# Patient Record
Sex: MALE | Race: WHITE | HISPANIC OR LATINO | Employment: OTHER | URBAN - METROPOLITAN AREA
[De-identification: names, ages, dates, MRNs, and addresses within clinical notes are randomized per-mention and may not be internally consistent; named-entity substitution may affect disease eponyms.]

---

## 2017-02-09 NOTE — PROGRESS NOTES
"  SUBJECTIVE:                                                    Latrell Knowles is a 51 year old male who presents to clinic today for the following health issues:      Diabetes Follow-up      Patient is checking blood sugars: every other day 120-130 in am    Diabetic concerns: None     Symptoms of hypoglycemia (low blood sugar): none     Paresthesias (numbness or burning in feet) or sores: Yes tingling in feet     Date of last diabetic eye exam: 7/2015     Hyperlipidemia Follow-Up      Rate your low fat/cholesterol diet?: good    Taking statin?  Yes, no muscle aches from statin    Other lipid medications/supplements?:  none     Hypertension Follow-up      Outpatient blood pressures are not being checked.    Low Salt Diet: no added salt         Amount of exercise or physical activity: 4 days/week for an average of 2 hours    Problems taking medications regularly: No    Medication side effects: none    Diet: portion control    Ongoing low back and leg pain even after low back surgery. Is very frustrated as his symptoms aren't improving and he is unable to work.     Problem list and histories reviewed & adjusted, as indicated.  Additional history: as documented    Problem list, Medication list, Allergies, and Medical/Social/Surgical histories reviewed in EPIC and updated as appropriate.    ROS:  Constitutional, HEENT, cardiovascular, pulmonary, GI, , musculoskeletal, neuro, skin, endocrine and psych systems are negative, except as otherwise noted.    OBJECTIVE:                                                    /66  Pulse 72  Temp 98  F (36.7  C) (Oral)  Ht 5' 8.5\" (1.74 m)  Wt 175 lb 6.4 oz (79.6 kg)  BMI 26.28 kg/m2  Body mass index is 26.28 kg/(m^2).  GENERAL: healthy, alert and no distress  NECK: no adenopathy, no asymmetry, masses, or scars and thyroid normal to palpation  RESP: lungs clear to auscultation - no rales, rhonchi or wheezes  CV: regular rate and rhythm, normal S1 S2, no S3 or S4, no " murmur, click or rub, no peripheral edema and peripheral pulses strong  ABDOMEN: soft, nontender, no hepatosplenomegaly, no masses and bowel sounds normal  MS: no gross musculoskeletal defects noted, no edema  NEURO: Normal strength and tone, mentation intact and speech normal  BACK: no CVA tenderness, no paralumbar tenderness  PSYCH: mentation appears normal, affect normal/bright    Diagnostic Test Results:  Results for orders placed or performed in visit on 02/13/17   Hepatitis C Screen Reflex to HCV RNA Quant and Genotype   Result Value Ref Range    Hepatitis C Antibody  NR     Nonreactive   Assay performance characteristics have not been established for newborns,   infants, and children     HEMOGLOBIN A1C   Result Value Ref Range    Hemoglobin A1C 9.1 (H) 4.3 - 6.0 %   Comprehensive metabolic panel   Result Value Ref Range    Sodium 138 133 - 144 mmol/L    Potassium 5.3 3.4 - 5.3 mmol/L    Chloride 104 94 - 109 mmol/L    Carbon Dioxide 30 20 - 32 mmol/L    Anion Gap 4 3 - 14 mmol/L    Glucose 200 (H) 70 - 99 mg/dL    Urea Nitrogen 13 7 - 30 mg/dL    Creatinine 0.72 0.66 - 1.25 mg/dL    GFR Estimate >90  Non  GFR Calc   >60 mL/min/1.7m2    GFR Estimate If Black >90   GFR Calc   >60 mL/min/1.7m2    Calcium 9.4 8.5 - 10.1 mg/dL    Bilirubin Total 0.5 0.2 - 1.3 mg/dL    Albumin 4.2 3.4 - 5.0 g/dL    Protein Total 7.7 6.8 - 8.8 g/dL    Alkaline Phosphatase 106 40 - 150 U/L    ALT 28 0 - 70 U/L    AST 20 0 - 45 U/L   Albumin Random Urine Quantitative   Result Value Ref Range    Creatinine Urine 137 mg/dL    Albumin Urine mg/L 38 mg/L    Albumin Urine mg/g Cr 27.96 (H) 0 - 17 mg/g Cr        ASSESSMENT/PLAN:                                                      1. Type 2 diabetes mellitus with diabetic nephropathy, without long-term current use of insulin (H)  Has not been watching diet, but has been walking. Labs today.   - HEMOGLOBIN A1C  - atorvastatin (LIPITOR) 10 MG tablet; Take 1  tablet (10 mg) by mouth daily  Dispense: 90 tablet; Refill: 3  - metFORMIN (GLUCOPHAGE-XR) 500 MG 24 hr tablet; Take 4 tablets (2,000 mg) by mouth daily (with dinner)  Dispense: 360 tablet; Refill: 3  - Comprehensive metabolic panel  - Albumin Random Urine Quantitative  - Hemoglobin A1c; Future  - Lipid panel reflex to direct LDL; Future  - Comprehensive metabolic panel; Future    2. Hyperlipidemia LDL goal <100  On statin  - Comprehensive metabolic panel    3. CKD (chronic kidney disease) stage 1, GFR 90 ml/min or greater  Excellent GFR.     4. Microalbuminuria due to type 2 diabetes mellitus (H)  Doing well.  - lisinopril (PRINIVIL/ZESTRIL) 10 MG tablet; Take 1 tablet (10 mg) by mouth daily  Dispense: 90 tablet; Refill: 3    5. Need for hepatitis C screening test    - Hepatitis C Screen Reflex to HCV RNA Quant and Genotype    6. Screen for colon cancer      7. Need for pneumococcal vaccine    - PNEUMOCOCCAL CONJ VACCINE 13 VALENT IM (PCV13)    8. Chronic left-sided low back pain with left-sided sciatica  He is agreeable to consult with pain management team and see if they have other recommendations for him.  - PAIN MANAGEMENT CENTER ([place]) REFERRAL    Patient Instructions   Let's do fasting labs in May. Let's meet after that in clinic.    I've done a referral to the pain management center in [place]. I'm hoping they can make recommendations to help with your pain.    Let's keep your medications the same for now.      Jo Recinos MD  [facility]

## 2017-02-13 ENCOUNTER — OFFICE VISIT (OUTPATIENT)
Dept: PEDIATRICS | Facility: CLINIC | Age: 52
End: 2017-02-13
Payer: COMMERCIAL

## 2017-02-13 VITALS
HEIGHT: 69 IN | WEIGHT: 175.4 LBS | TEMPERATURE: 98 F | SYSTOLIC BLOOD PRESSURE: 108 MMHG | BODY MASS INDEX: 25.98 KG/M2 | DIASTOLIC BLOOD PRESSURE: 66 MMHG | HEART RATE: 72 BPM

## 2017-02-13 DIAGNOSIS — G89.29 CHRONIC LEFT-SIDED LOW BACK PAIN WITH LEFT-SIDED SCIATICA: ICD-10-CM

## 2017-02-13 DIAGNOSIS — E11.29 MICROALBUMINURIA DUE TO TYPE 2 DIABETES MELLITUS (H): ICD-10-CM

## 2017-02-13 DIAGNOSIS — R80.9 MICROALBUMINURIA DUE TO TYPE 2 DIABETES MELLITUS (H): ICD-10-CM

## 2017-02-13 DIAGNOSIS — E78.5 HYPERLIPIDEMIA LDL GOAL <100: ICD-10-CM

## 2017-02-13 DIAGNOSIS — N18.1 CKD (CHRONIC KIDNEY DISEASE) STAGE 1, GFR 90 ML/MIN OR GREATER: ICD-10-CM

## 2017-02-13 DIAGNOSIS — M54.42 CHRONIC LEFT-SIDED LOW BACK PAIN WITH LEFT-SIDED SCIATICA: ICD-10-CM

## 2017-02-13 DIAGNOSIS — Z11.59 NEED FOR HEPATITIS C SCREENING TEST: ICD-10-CM

## 2017-02-13 DIAGNOSIS — E11.21 TYPE 2 DIABETES MELLITUS WITH DIABETIC NEPHROPATHY, WITHOUT LONG-TERM CURRENT USE OF INSULIN (H): Primary | ICD-10-CM

## 2017-02-13 DIAGNOSIS — Z12.11 SCREEN FOR COLON CANCER: ICD-10-CM

## 2017-02-13 DIAGNOSIS — Z23 NEED FOR PNEUMOCOCCAL VACCINE: ICD-10-CM

## 2017-02-13 LAB — HBA1C MFR BLD: 9.1 % (ref 4.3–6)

## 2017-02-13 PROCEDURE — 90471 IMMUNIZATION ADMIN: CPT | Performed by: INTERNAL MEDICINE

## 2017-02-13 PROCEDURE — 90670 PCV13 VACCINE IM: CPT | Performed by: INTERNAL MEDICINE

## 2017-02-13 PROCEDURE — 80053 COMPREHEN METABOLIC PANEL: CPT | Performed by: INTERNAL MEDICINE

## 2017-02-13 PROCEDURE — 86803 HEPATITIS C AB TEST: CPT | Performed by: INTERNAL MEDICINE

## 2017-02-13 PROCEDURE — 99214 OFFICE O/P EST MOD 30 MIN: CPT | Mod: 25 | Performed by: INTERNAL MEDICINE

## 2017-02-13 PROCEDURE — 82043 UR ALBUMIN QUANTITATIVE: CPT | Performed by: INTERNAL MEDICINE

## 2017-02-13 PROCEDURE — 36415 COLL VENOUS BLD VENIPUNCTURE: CPT | Performed by: INTERNAL MEDICINE

## 2017-02-13 PROCEDURE — 83036 HEMOGLOBIN GLYCOSYLATED A1C: CPT | Performed by: INTERNAL MEDICINE

## 2017-02-13 RX ORDER — METFORMIN HCL 500 MG
2000 TABLET, EXTENDED RELEASE 24 HR ORAL
Qty: 360 TABLET | Refills: 3 | Status: SHIPPED | OUTPATIENT
Start: 2017-02-13 | End: 2018-06-27

## 2017-02-13 RX ORDER — ATORVASTATIN CALCIUM 10 MG/1
10 TABLET, FILM COATED ORAL DAILY
Qty: 90 TABLET | Refills: 3 | Status: SHIPPED | OUTPATIENT
Start: 2017-02-13 | End: 2018-06-27

## 2017-02-13 RX ORDER — LISINOPRIL 10 MG/1
10 TABLET ORAL DAILY
Qty: 90 TABLET | Refills: 3 | Status: SHIPPED | OUTPATIENT
Start: 2017-02-13 | End: 2018-05-03

## 2017-02-13 NOTE — MR AVS SNAPSHOT
After Visit Summary   2/13/2017    Latrell Knowles    MRN: [id number]           Patient Information     Date Of Birth          1965        Visit Information        Provider Department      2/13/2017 1:30 PM Jo Rosado MD [facility] [place]        Today's Diagnoses     Type 2 diabetes mellitus with diabetic nephropathy, without long-term current use of insulin (H)    -  1    Hyperlipidemia LDL goal <100        CKD (chronic kidney disease) stage 1, GFR 90 ml/min or greater        Microalbuminuria due to type 2 diabetes mellitus (H)        Need for hepatitis C screening test        Screen for colon cancer        Need for pneumococcal vaccine        Chronic left-sided low back pain with left-sided sciatica          Care Instructions    Let's do fasting labs in May. Let's meet after that in clinic.    I've done a referral to the pain management center in [place]. I'm hoping they can make recommendations to help with your pain.    Let's keep your medications the same for now.        Follow-ups after your visit        Additional Services     PAIN MANAGEMENT CENTER ([place]) REFERRAL       Your provider has referred you to the [facility].    Reason for Referral: Comprehensive Evaluation and Management    Please complete the following questions:    What is your diagnosis for the patient's pain? Lumbar disk disease/persistent pain after back surgery    Do you have any specific questions for the pain specialist? No    Are there any red flags that may impact the assessment or management of the patient? None    **ANY DIAGNOSTIC TESTS THAT ARE NOT IN [facility] SHOULD BE SENT TO THE PAIN CENTER**    Please note the Pre-Op Pain Consult must be scheduled 2-3 weeks prior to the patient's surgery.  Patient's trying to schedule within 2 weeks of surgery may not be accommodated.     Pre-Op Pain Consults are only good for 30 days.    REGARDING OPIOID MEDICATIONS:  We will always  address appropriateness of opioid pain medications, but we generally will not automatically take on a prescribing role. When we do take on prescribing of opioids for chronic pain, it is in collaboration with the referring physician for an intermediate period of time (months), with an expectation that the primary physician or provider will assume the prescribing role if medications are effective at stable doses with demonstrated compliance.  Therefore, please do not assume that your prescribing responsibilities end on the day of pain clinic consultation.  Is this agreeable to you? YES    For any questions, contact the [facility] at [phone].    Please be aware that coverage of these services is subject to the terms and limitations of your health insurance plan.  Call member services at your health plan with any benefit or coverage questions.      Please bring the following with you to your appointment:    (1) Any X-Rays, CTs or MRIs which have been performed.  Contact the facility where they were done to arrange for  prior to your scheduled appointment.    (2) List of current medications   (3) This referral request   (4) Any documents/labs given to you for this referral                  Future tests that were ordered for you today     Open Future Orders        Priority Expected Expires Ordered    Hemoglobin A1c Routine  6/13/2017 2/13/2017    Lipid panel reflex to direct LDL Routine  8/16/2017 2/13/2017    Comprehensive metabolic panel Routine  8/15/2017 2/13/2017            Who to contact     If you have questions or need follow up information about today's clinic visit or your schedule please contact [facility] directly at [phone].  Normal or non-critical lab and imaging results will be communicated to you by MyChart, letter or phone within 4 business days after the clinic has received the results. If you do not hear from us within 7 days, please contact the clinic  "through [employer] or phone. If you have a critical or abnormal lab result, we will notify you by phone as soon as possible.  Submit refill requests through [employer] or call your pharmacy and they will forward the refill request to us. Please allow 3 business days for your refill to be completed.          Additional Information About Your Visit        [employer] Information     [employer] gives you secure access to your electronic health record. If you see a primary care provider, you can also send messages to your care team and make appointments. If you have questions, please call your primary care clinic.  If you do not have a primary care provider, please call [phone] and they will assist you.        Care EveryWhere ID     This is your Care EveryWhere ID. This could be used by other organizations to access your [facility] medical records  [id number]        Your Vitals Were     Pulse Temperature Height BMI (Body Mass Index)          72 98  F (36.7  C) (Oral) 5' 8.5\" (1.74 m) 26.28 kg/m2         Blood Pressure from Last 3 Encounters:   02/13/17 108/66   05/09/16 110/72   08/17/15 126/78    Weight from Last 3 Encounters:   02/13/17 175 lb 6.4 oz (79.6 kg)   05/09/16 180 lb 4.8 oz (81.8 kg)   08/17/15 173 lb 9.6 oz (78.7 kg)              We Performed the Following     Albumin Random Urine Quantitative     Comprehensive metabolic panel     HEMOGLOBIN A1C     Hepatitis C Screen Reflex to HCV RNA Quant and Genotype     PAIN MANAGEMENT CENTER ([place]) REFERRAL     PNEUMOCOCCAL CONJ VACCINE 13 VALENT IM (PCV13)          Today's Medication Changes          These changes are accurate as of: 2/13/17  2:23 PM.  If you have any questions, ask your nurse or doctor.               These medicines have changed or have updated prescriptions.        Dose/Directions    gabapentin 300 MG capsule   Commonly known as:  NEURONTIN   This may have changed:    - when to take this  - additional instructions   Used for:  Chronic low back pain    "     Dose:  300 mg   Take 1 capsule (300 mg) by mouth 3 times daily Take 1 tablet (300 mg) every night for 1-3 days, then 1 tablet twice daily for 1-3 days, then 1 tablet three times daily   Quantity:  90 capsule   Refills:  0       lisinopril 10 MG tablet   Commonly known as:  PRINIVIL/ZESTRIL   This may have changed:  Another medication with the same name was removed. Continue taking this medication, and follow the directions you see here.   Used for:  Microalbuminuria due to type 2 diabetes mellitus (H)   Changed by:  Jo Rosado MD        Dose:  10 mg   Take 1 tablet (10 mg) by mouth daily   Quantity:  90 tablet   Refills:  3            Where to get your medicines      These medications were sent to [id number] - [place], [place] - [address]  [address] [place] [address]     Phone:  [phone]     atorvastatin 10 MG tablet    lisinopril 10 MG tablet    metFORMIN 500 MG 24 hr tablet                Primary Care Provider Office Phone # Fax #    Jo Rosado [phone]       [address] [place] [address]        Thank you!     Thank you for choosing [facility]  for your care. Our goal is always to provide you with excellent care. Hearing back from our patients is one way we can continue to improve our services. Please take a few minutes to complete the written survey that you may receive in the mail after your visit with us. Thank you!             Your Updated Medication List - Protect others around you: Learn how to safely use, store and throw away your medicines at www.disposemymeds.org.          This list is accurate as of: 2/13/17  2:23 PM.  Always use your most recent med list.                   Brand Name Dispense Instructions for use    atorvastatin 10 MG tablet    LIPITOR    90 tablet    Take 1 tablet (10 mg) by mouth daily       blood glucose monitoring lancets     3 Box    1 each 4 times daily Use to test blood sugar 4  times daily or as directed.       blood glucose monitoring test strip    ACCU-CHEK SMARTVIEW    3 Box    1 strip by In Vitro route 4 times daily (before meals and nightly) Use to test blood sugar 4 times daily or as directed.       cyclobenzaprine 10 MG tablet    FLEXERIL     Take 10 mg by mouth 3 times daily as needed 1 tab bid       gabapentin 300 MG capsule    NEURONTIN    90 capsule    Take 1 capsule (300 mg) by mouth 3 times daily Take 1 tablet (300 mg) every night for 1-3 days, then 1 tablet twice daily for 1-3 days, then 1 tablet three times daily       ibuprofen 800 MG tablet    ADVIL/MOTRIN     Take 800 mg by mouth every 8 hours as needed for moderate pain       lisinopril 10 MG tablet    PRINIVIL/ZESTRIL    90 tablet    Take 1 tablet (10 mg) by mouth daily       metFORMIN 500 MG 24 hr tablet    GLUCOPHAGE-XR    360 tablet    Take 4 tablets (2,000 mg) by mouth daily (with dinner)       tadalafil 20 MG tablet    CIALIS    10 tablet    Take 1 tablet (20 mg) by mouth daily as needed for erectile dysfunction       terbinafine 1 % cream    lamISIL AT    12 g    Apply topically 2 times daily For fungal infection not resolved with other antifungals (e.g. Clotrimazole)       traMADol 50 MG tablet    ULTRAM     Take by mouth every 6 hours as needed Every 4-6 hours as needed

## 2017-02-13 NOTE — PATIENT INSTRUCTIONS
Let's do fasting labs in May. Let's meet after that in clinic.    I've done a referral to the pain management center in [place]. I'm hoping they can make recommendations to help with your pain.    Let's keep your medications the same for now.

## 2017-02-13 NOTE — NURSING NOTE
"Chief Complaint   Patient presents with     Diabetes       Initial /66  Pulse 72  Temp 98  F (36.7  C) (Oral)  Ht 5' 8.5\" (1.74 m)  Wt 175 lb 6.4 oz (79.6 kg)  BMI 26.28 kg/m2 Estimated body mass index is 26.28 kg/(m^2) as calculated from the following:    Height as of this encounter: 5' 8.5\" (1.74 m).    Weight as of this encounter: 175 lb 6.4 oz (79.6 kg).  Medication Reconciliation: complete   Krystina Severino LPN    "

## 2017-02-14 ENCOUNTER — TELEPHONE (OUTPATIENT)
Dept: PALLIATIVE MEDICINE | Facility: CLINIC | Age: 52
End: 2017-02-14

## 2017-02-14 LAB
ALBUMIN SERPL-MCNC: 4.2 G/DL (ref 3.4–5)
ALP SERPL-CCNC: 106 U/L (ref 40–150)
ALT SERPL W P-5'-P-CCNC: 28 U/L (ref 0–70)
ANION GAP SERPL CALCULATED.3IONS-SCNC: 4 MMOL/L (ref 3–14)
AST SERPL W P-5'-P-CCNC: 20 U/L (ref 0–45)
BILIRUB SERPL-MCNC: 0.5 MG/DL (ref 0.2–1.3)
BUN SERPL-MCNC: 13 MG/DL (ref 7–30)
CALCIUM SERPL-MCNC: 9.4 MG/DL (ref 8.5–10.1)
CHLORIDE SERPL-SCNC: 104 MMOL/L (ref 94–109)
CO2 SERPL-SCNC: 30 MMOL/L (ref 20–32)
CREAT SERPL-MCNC: 0.72 MG/DL (ref 0.66–1.25)
CREAT UR-MCNC: 137 MG/DL
GFR SERPL CREATININE-BSD FRML MDRD: ABNORMAL ML/MIN/1.7M2
GLUCOSE SERPL-MCNC: 200 MG/DL (ref 70–99)
HCV AB SERPL QL IA: NORMAL
MICROALBUMIN UR-MCNC: 38 MG/L
MICROALBUMIN/CREAT UR: 27.96 MG/G CR (ref 0–17)
POTASSIUM SERPL-SCNC: 5.3 MMOL/L (ref 3.4–5.3)
PROT SERPL-MCNC: 7.7 G/DL (ref 6.8–8.8)
SODIUM SERPL-SCNC: 138 MMOL/L (ref 133–144)

## 2017-03-24 ENCOUNTER — TRANSFERRED RECORDS (OUTPATIENT)
Dept: HEALTH INFORMATION MANAGEMENT | Facility: CLINIC | Age: 52
End: 2017-03-24

## 2017-05-10 ENCOUNTER — TELEPHONE (OUTPATIENT)
Dept: PHARMACY | Facility: CLINIC | Age: 52
End: 2017-05-10

## 2017-05-10 NOTE — LETTER
[facility]  [address]  [address]  [place] [address] [phone]      June 6, 2017      Latrell Knowles  [address]     [place] [address]        Dear Kyree Sams, your provider has reviewed your chart and it looks like you are due for a diabetic follow up office visit. If you could schedule this to your earliest convenience. If you have any questions feel free to call the clinic at [phone] . Thank you.                Sincerely, Grisel ROSE

## 2017-05-10 NOTE — TELEPHONE ENCOUNTER
Please have patient make a visit with Dr. Rosado for follow-up diabetes.    May Mcgowan, PharmD [facility]  Medication Therapy Management Practitioner   #[phone]

## 2017-05-11 NOTE — TELEPHONE ENCOUNTER
Type of outreach:  Sent Re[employer] message.  Health Maintenance Due   Topic Date Due     COLON CANCER SCREEN (SYSTEM ASSIGNED)  04/02/2014     EYE EXAM Q1 YEAR( NO INBASKET)  07/21/2016     TSH W/ FREE T4 REFLEX Q2 YEAR (NO INBASKET)  05/06/2017     FOOT EXAM Q1 YEAR( NO INBASKET)  05/09/2017     LIPID MONITORING Q1 YEAR( NO INBASKET)  05/09/2017     Genesis Lloyd MA

## 2017-06-06 NOTE — TELEPHONE ENCOUNTER
Type of outreach:  Phone, left message for patient to call back.  and Sent letter.  Health Maintenance Due   Topic Date Due     COLON CANCER SCREEN (SYSTEM ASSIGNED)  04/02/2014     EYE EXAM Q1 YEAR  07/21/2016     TSH W/ FREE T4 REFLEX Q2 YEAR  05/06/2017     FOOT EXAM Q1 YEAR  05/09/2017     LIPID MONITORING Q1 YEAR  05/09/2017     Genesis Lloyd MA

## 2017-09-14 ENCOUNTER — TELEPHONE (OUTPATIENT)
Dept: PEDIATRICS | Facility: CLINIC | Age: 52
End: 2017-09-14

## 2017-09-14 NOTE — TELEPHONE ENCOUNTER
Type of outreach:  Phone, left message for patient to call back.   Health Maintenance Due   Topic Date Due     COLON CANCER SCREEN (SYSTEM ASSIGNED)  04/02/2014     EYE EXAM Q1 YEAR  07/21/2016     TSH W/ FREE T4 REFLEX Q2 YEAR  05/06/2017     FOOT EXAM Q1 YEAR  05/09/2017     LIPID MONITORING Q1 YEAR  05/09/2017     A1C Q6 MO  08/13/2017     INFLUENZA VACCINE (SYSTEM ASSIGNED)  09/01/2017     Krystina Severino LPN

## 2018-01-03 ENCOUNTER — OFFICE VISIT (OUTPATIENT)
Dept: PEDIATRICS | Facility: CLINIC | Age: 53
End: 2018-01-03
Payer: MEDICAID

## 2018-01-03 ENCOUNTER — HOSPITAL ENCOUNTER (EMERGENCY)
Facility: CLINIC | Age: 53
Discharge: HOME | End: 2018-01-03
Attending: EMERGENCY MEDICINE | Admitting: EMERGENCY MEDICINE
Payer: MEDICAID

## 2018-01-03 ENCOUNTER — APPOINTMENT (OUTPATIENT)
Dept: GENERAL RADIOLOGY | Facility: CLINIC | Age: 53
End: 2018-01-03
Attending: EMERGENCY MEDICINE
Payer: MEDICAID

## 2018-01-03 VITALS
HEART RATE: 62 BPM | WEIGHT: 174.3 LBS | DIASTOLIC BLOOD PRESSURE: 70 MMHG | TEMPERATURE: 97.7 F | SYSTOLIC BLOOD PRESSURE: 120 MMHG | BODY MASS INDEX: 25.82 KG/M2 | HEIGHT: 69 IN | OXYGEN SATURATION: 98 %

## 2018-01-03 VITALS
DIASTOLIC BLOOD PRESSURE: 96 MMHG | HEART RATE: 53 BPM | BODY MASS INDEX: 24.91 KG/M2 | TEMPERATURE: 97.6 F | RESPIRATION RATE: 16 BRPM | OXYGEN SATURATION: 98 % | WEIGHT: 174 LBS | HEIGHT: 70 IN | SYSTOLIC BLOOD PRESSURE: 145 MMHG

## 2018-01-03 DIAGNOSIS — R07.89 CHEST PRESSURE: Primary | ICD-10-CM

## 2018-01-03 DIAGNOSIS — R07.9 CHEST PAIN, UNSPECIFIED TYPE: ICD-10-CM

## 2018-01-03 LAB
ANION GAP SERPL CALCULATED.3IONS-SCNC: 6 MMOL/L (ref 3–14)
BASOPHILS # BLD AUTO: 0 10E9/L (ref 0–0.2)
BASOPHILS NFR BLD AUTO: 0.4 %
BUN SERPL-MCNC: 12 MG/DL (ref 7–30)
CALCIUM SERPL-MCNC: 8.9 MG/DL (ref 8.5–10.1)
CHLORIDE SERPL-SCNC: 101 MMOL/L (ref 94–109)
CO2 SERPL-SCNC: 29 MMOL/L (ref 20–32)
CREAT SERPL-MCNC: 0.62 MG/DL (ref 0.66–1.25)
D DIMER PPP FEU-MCNC: <0.3 UG/ML FEU (ref 0–0.5)
DIFFERENTIAL METHOD BLD: NORMAL
EOSINOPHIL # BLD AUTO: 0.2 10E9/L (ref 0–0.7)
EOSINOPHIL NFR BLD AUTO: 3.2 %
ERYTHROCYTE [DISTWIDTH] IN BLOOD BY AUTOMATED COUNT: 12.3 % (ref 10–15)
GFR SERPL CREATININE-BSD FRML MDRD: >90 ML/MIN/1.7M2
GLUCOSE SERPL-MCNC: 181 MG/DL (ref 70–99)
HBA1C MFR BLD: 8.6 % (ref 4.3–6)
HCT VFR BLD AUTO: 46.5 % (ref 40–53)
HGB BLD-MCNC: 15.8 G/DL (ref 13.3–17.7)
IMM GRANULOCYTES # BLD: 0 10E9/L (ref 0–0.4)
IMM GRANULOCYTES NFR BLD: 0.4 %
INTERPRETATION ECG - MUSE: NORMAL
LYMPHOCYTES # BLD AUTO: 1.3 10E9/L (ref 0.8–5.3)
LYMPHOCYTES NFR BLD AUTO: 26.5 %
MCH RBC QN AUTO: 28.3 PG (ref 26.5–33)
MCHC RBC AUTO-ENTMCNC: 34 G/DL (ref 31.5–36.5)
MCV RBC AUTO: 83 FL (ref 78–100)
MONOCYTES # BLD AUTO: 0.4 10E9/L (ref 0–1.3)
MONOCYTES NFR BLD AUTO: 7.9 %
NEUTROPHILS # BLD AUTO: 3.1 10E9/L (ref 1.6–8.3)
NEUTROPHILS NFR BLD AUTO: 61.6 %
NRBC # BLD AUTO: 0 10*3/UL
NRBC BLD AUTO-RTO: 0 /100
PLATELET # BLD AUTO: 180 10E9/L (ref 150–450)
POTASSIUM SERPL-SCNC: 4 MMOL/L (ref 3.4–5.3)
RBC # BLD AUTO: 5.59 10E12/L (ref 4.4–5.9)
SODIUM SERPL-SCNC: 136 MMOL/L (ref 133–144)
TROPONIN I BLD-MCNC: 0 UG/L (ref 0–0.1)
TROPONIN I SERPL-MCNC: <0.015 UG/L (ref 0–0.04)
WBC # BLD AUTO: 5 10E9/L (ref 4–11)

## 2018-01-03 PROCEDURE — 96361 HYDRATE IV INFUSION ADD-ON: CPT

## 2018-01-03 PROCEDURE — 80061 LIPID PANEL: CPT | Performed by: INTERNAL MEDICINE

## 2018-01-03 PROCEDURE — 80048 BASIC METABOLIC PNL TOTAL CA: CPT | Performed by: EMERGENCY MEDICINE

## 2018-01-03 PROCEDURE — 84484 ASSAY OF TROPONIN QUANT: CPT | Performed by: EMERGENCY MEDICINE

## 2018-01-03 PROCEDURE — 85379 FIBRIN DEGRADATION QUANT: CPT | Performed by: EMERGENCY MEDICINE

## 2018-01-03 PROCEDURE — 96374 THER/PROPH/DIAG INJ IV PUSH: CPT

## 2018-01-03 PROCEDURE — 83036 HEMOGLOBIN GLYCOSYLATED A1C: CPT | Performed by: INTERNAL MEDICINE

## 2018-01-03 PROCEDURE — 99285 EMERGENCY DEPT VISIT HI MDM: CPT | Mod: 25

## 2018-01-03 PROCEDURE — 85025 COMPLETE CBC W/AUTO DIFF WBC: CPT | Performed by: EMERGENCY MEDICINE

## 2018-01-03 PROCEDURE — 71046 X-RAY EXAM CHEST 2 VIEWS: CPT

## 2018-01-03 PROCEDURE — 99215 OFFICE O/P EST HI 40 MIN: CPT | Performed by: INTERNAL MEDICINE

## 2018-01-03 PROCEDURE — 80050 GENERAL HEALTH PANEL: CPT | Performed by: INTERNAL MEDICINE

## 2018-01-03 PROCEDURE — 36415 COLL VENOUS BLD VENIPUNCTURE: CPT | Performed by: INTERNAL MEDICINE

## 2018-01-03 PROCEDURE — 93000 ELECTROCARDIOGRAM COMPLETE: CPT | Performed by: INTERNAL MEDICINE

## 2018-01-03 PROCEDURE — 93005 ELECTROCARDIOGRAM TRACING: CPT

## 2018-01-03 PROCEDURE — 25000128 H RX IP 250 OP 636: Performed by: EMERGENCY MEDICINE

## 2018-01-03 PROCEDURE — 84484 ASSAY OF TROPONIN QUANT: CPT

## 2018-01-03 RX ORDER — HYDROMORPHONE HYDROCHLORIDE 1 MG/ML
0.5 INJECTION, SOLUTION INTRAMUSCULAR; INTRAVENOUS; SUBCUTANEOUS ONCE
Status: COMPLETED | OUTPATIENT
Start: 2018-01-03 | End: 2018-01-03

## 2018-01-03 RX ADMIN — SODIUM CHLORIDE 1000 ML: 9 INJECTION, SOLUTION INTRAVENOUS at 14:16

## 2018-01-03 RX ADMIN — Medication 0.5 MG: at 14:16

## 2018-01-03 ASSESSMENT — ENCOUNTER SYMPTOMS: NAUSEA: 0

## 2018-01-03 NOTE — ED AVS SNAPSHOT
[facility]    [address]    [facility] [address]    Phone:  [phone]    Fax:  [phone]                                       Latrell Knowles   MRN: [id number]    Department:  [facility]   Date of Visit:  1/3/2018           After Visit Summary Signature Page     I have received my discharge instructions, and my questions have been answered. I have discussed any challenges I see with this plan with the nurse or doctor.    ..........................................................................................................................................  Patient/Patient Representative Signature      ..........................................................................................................................................  Patient Representative Print Name and Relationship to Patient    ..................................................               ................................................  Date                                            Time    ..........................................................................................................................................  Reviewed by Signature/Title    ...................................................              ..............................................  Date                                                            Time

## 2018-01-03 NOTE — NURSING NOTE
"Chief Complaint   Patient presents with     Diabetes       Initial /70 (Cuff Size: Adult Regular)  Pulse 62  Temp 97.7  F (36.5  C) (Oral)  Ht 5' 8.7\" (1.745 m)  Wt 174 lb 4.8 oz (79.1 kg)  SpO2 98%  BMI 25.96 kg/m2 Estimated body mass index is 25.96 kg/(m^2) as calculated from the following:    Height as of this encounter: 5' 8.7\" (1.745 m).    Weight as of this encounter: 174 lb 4.8 oz (79.1 kg).  Medication Reconciliation: complete   Krystina Severino LPN      At 1254 gave 4 baby aspirin (chewable) per Dr Rosado's order.    Krystina Severino LPN    "

## 2018-01-03 NOTE — ED AVS SNAPSHOT
[facility]    [address] [place] [address]    Phone:  [phone]    Fax:  [phone]                                       Latrell Knowles   MRN: [id number]    Department:  [facility]   Date of Visit:  1/3/2018           Patient Information     Date Of Birth          1965        Your diagnoses for this visit were:     Chest pain, unspecified type        You were seen by Mayela Lee MD.      Follow-up Information     Follow up with Jo Rosado MD In 3 days.    Specialties:  Internal Medicine, Pediatrics    Contact information:    [address] [place] [address]   [place] [id number] [phone]          Discharge Instructions       Please follow up closely with your regular physician and the stress test.       Please return to the ED if your symptoms worsen or if you develop new or concerning symptoms.     Discharge Instructions  Chest Pain    You have been seen today for chest pain or discomfort.  At this time, your doctor has found no signs that your chest pain is due to a serious or life-threatening condition, (or you have declined more testing and/or admission to the hospital). However, sometimes there is a serious problem that does not show up right away. Your evaluation today may not be complete and you may need further testing and evaluation.     You need to follow-up with your regular doctor within 3 days.    Return to the Emergency Department if:    Your chest pain changes, gets worse, starts to happen more often, or comes with less activity.    You are short of breath.    You get very weak or tired.    You pass out or faint.    You have any new symptoms, like fever, cough, numb legs, or you cough up blood.    You have anything else that worries you.    Until you follow-up with your regular doctor please do the following:    Take one aspirin daily unless you have an allergy or are told not to by  your doctor.    If a stress test appointment has been made, go to the appointment.    If you have questions, contact your regular doctor.    If your doctor today has told you to follow-up with your regular doctor, it is very important that you make an appointment with your clinic and go to the appointment.  If you do not follow-up with your primary doctor, it may result in missing an important development which could result in permanent injury or disability and/or lasting pain.  If there is any problem keeping your appointment, call your doctor or return to the Emergency Department.    If you were given a prescription for medicine here today, be sure to read all of the information (including the package insert) that comes with your prescription.  This will include important information about the medicine, its side effects, and any warnings that you need to know about.  The pharmacist who fills the prescription can provide more information and answer questions you may have about the medicine.  If you have questions or concerns that the pharmacist cannot address, please call or return to the Emergency Department.     Opioid Medication Information    Pain medications are among the most commonly prescribed medicines, so we are including this information for all our patients. If you did not receive pain medication or get a prescription for pain medicine, you can ignore it.     You may have been given a prescription for an opioid (narcotic) pain medicine and/or have received a pain medicine while here in the Emergency Department. These medicines can make you drowsy or impaired. You must not drive, operate dangerous equipment, or engage in any other dangerous activities while taking these medications. If you drive while taking these medications, you could be arrested for DUI, or driving under the influence. Do not drink any alcohol while you are taking these medications.     Opioid pain medications can cause addiction. If  you have a history of chemical dependency of any type, you are at a higher risk of becoming addicted to pain medications.  Only take these prescribed medications to treat your pain when all other options have been tried. Take it for as short a time and as few doses as possible. Store your pain pills in a secure place, as they are frequently stolen and provide a dangerous opportunity for children or visitors in your house to start abusing these powerful medications. We will not replace any lost or stolen medicine.  As soon as your pain is better, you should flush all your remaining medication.     Many prescription pain medications contain Tylenol  (acetaminophen), including Vicodin , Tylenol #3 , Norco , Lortab , and Percocet .  You should not take any extra pills of Tylenol  if you are using these prescription medications or you can get very sick.  Do not ever take more than 3000 mg of acetaminophen in any 24 hour period.    All opioids tend to cause constipation. Drink plenty of water and eat foods that have a lot of fiber, such as fruits, vegetables, prune juice, apple juice and high fiber cereal.  Take a laxative if you don t move your bowels at least every other day. Miralax , Milk of Magnesia, Colace , or Senna  can be used to keep you regular.      Remember that you can always come back to the Emergency Department if you are not able to see your regular doctor in the amount of time listed above, if you get any new symptoms, or if there is anything that worries you.          24 Hour Appointment Hotline       To make an appointment at any [facility], call [phone] ([phone]). If you don't have a family doctor or clinic, we will help you find one. [place] clinics are conveniently located to serve the needs of you and your family.          ED Discharge Orders     Exercise Stress Echocardiogram       Administration of IV contrast will be tailored to this examination per the appropriate written  protocol listed in the Echocardiography department Protocol Book, or by the supervising Cardiologist. This may result in an order change.    Use of contrast is at the discretion of the supervising Cardiologist.                     Review of your medicines      Our records show that you are taking the medicines listed below. If these are incorrect, please call your family doctor or clinic.        Dose / Directions Last dose taken    aspirin 81 MG tablet   Dose:  324 mg        Take 324 mg by mouth once   Refills:  0        atorvastatin 10 MG tablet   Commonly known as:  LIPITOR   Dose:  10 mg   Quantity:  90 tablet        Take 1 tablet (10 mg) by mouth daily   Refills:  3        blood glucose monitoring lancets   Dose:  1 each   Quantity:  3 Box        1 each 4 times daily Use to test blood sugar 4 times daily or as directed.   Refills:  3        blood glucose monitoring test strip   Commonly known as:  ACCU-CHEK SMARTVIEW   Dose:  1 strip   Quantity:  3 Box        1 strip by In Vitro route 4 times daily (before meals and nightly) Use to test blood sugar 4 times daily or as directed.   Refills:  prn        cyclobenzaprine 10 MG tablet   Commonly known as:  FLEXERIL   Dose:  10 mg        Take 10 mg by mouth 3 times daily as needed 1 tab bid   Refills:  0        gabapentin 300 MG capsule   Commonly known as:  NEURONTIN   Dose:  300 mg   Quantity:  90 capsule        Take 1 capsule (300 mg) by mouth 3 times daily Take 1 tablet (300 mg) every night for 1-3 days, then 1 tablet twice daily for 1-3 days, then 1 tablet three times daily   Refills:  0        ibuprofen 800 MG tablet   Commonly known as:  ADVIL/MOTRIN   Dose:  800 mg        Take 800 mg by mouth every 8 hours as needed for moderate pain   Refills:  0        lisinopril 10 MG tablet   Commonly known as:  PRINIVIL/ZESTRIL   Dose:  10 mg   Quantity:  90 tablet        Take 1 tablet (10 mg) by mouth daily   Refills:  3        metFORMIN 500 MG 24 hr tablet   Commonly  known as:  GLUCOPHAGE-XR   Dose:  2000 mg   Quantity:  360 tablet        Take 4 tablets (2,000 mg) by mouth daily (with dinner)   Refills:  3        tadalafil 20 MG tablet   Commonly known as:  CIALIS   Dose:  20 mg   Quantity:  10 tablet        Take 1 tablet (20 mg) by mouth daily as needed for erectile dysfunction   Refills:  1        traMADol 50 MG tablet   Commonly known as:  ULTRAM        Take by mouth every 6 hours as needed Every 4-6 hours as needed   Refills:  0                Procedures and tests performed during your visit     Procedure/Test Number of Times Performed    Basic metabolic panel 1    CBC with platelets differential 1    Cardiac Continuous Monitoring 1    Chest XR,  PA & LAT 1    D dimer quantitative 1    EKG 12 lead 2    Peripheral IV: Standard 1    Pulse oximetry nursing 1    Troponin I 1    Troponin POCT 1      Orders Needing Specimen Collection     None      Pending Results     Date and Time Order Name Status Description    1/3/2018 1104 COMPREHENSIVE METABOLIC PANEL In process     1/3/2018 1102 TSH WITH FREE T4 REFLEX In process     1/3/2018 1102 LIPID REFLEX TO DIRECT LDL PANEL In process             Pending Culture Results     No orders found from 1/1/2018 to 1/4/2018.            Pending Results Instructions     If you had any lab results that were not finalized at the time of your Discharge, you can call the ED Lab Result RN at [phone]. You will be contacted by this team for any positive Lab results or changes in treatment. The nurses are available 7 days a week from 10A to 6:30P.  You can leave a message 24 hours per day and they will return your call.        Test Results From Your Hospital Stay        1/3/2018  2:02 PM      Component Results     Component Value Ref Range & Units Status    WBC 5.0 4.0 - 11.0 10e9/L Final    RBC Count 5.59 4.4 - 5.9 10e12/L Final    Hemoglobin 15.8 13.3 - 17.7 g/dL Final    Hematocrit 46.5 40.0 - 53.0 % Final    MCV 83 78 - 100 fl Final    MCH 28.3  26.5 - 33.0 pg Final    MCHC 34.0 31.5 - 36.5 g/dL Final    RDW 12.3 10.0 - 15.0 % Final    Platelet Count 180 150 - 450 10e9/L Final    Diff Method Automated Method  Final    % Neutrophils 61.6 % Final    % Lymphocytes 26.5 % Final    % Monocytes 7.9 % Final    % Eosinophils 3.2 % Final    % Basophils 0.4 % Final    % Immature Granulocytes 0.4 % Final    Nucleated RBCs 0 0 /100 Final    Absolute Neutrophil 3.1 1.6 - 8.3 10e9/L Final    Absolute Lymphocytes 1.3 0.8 - 5.3 10e9/L Final    Absolute Monocytes 0.4 0.0 - 1.3 10e9/L Final    Absolute Eosinophils 0.2 0.0 - 0.7 10e9/L Final    Absolute Basophils 0.0 0.0 - 0.2 10e9/L Final    Abs Immature Granulocytes 0.0 0 - 0.4 10e9/L Final    Absolute Nucleated RBC 0.0  Final         1/3/2018  2:31 PM      Component Results     Component Value Ref Range & Units Status    Sodium 136 133 - 144 mmol/L Final    Potassium 4.0 3.4 - 5.3 mmol/L Final    Chloride 101 94 - 109 mmol/L Final    Carbon Dioxide 29 20 - 32 mmol/L Final    Anion Gap 6 3 - 14 mmol/L Final    Glucose 181 (H) 70 - 99 mg/dL Final    Urea Nitrogen 12 7 - 30 mg/dL Final    Creatinine 0.62 (L) 0.66 - 1.25 mg/dL Final    GFR Estimate >90 >60 mL/min/1.7m2 Final    Non  GFR Calc    GFR Estimate If Black >90 >60 mL/min/1.7m2 Final    African American GFR Calc    Calcium 8.9 8.5 - 10.1 mg/dL Final         1/3/2018  2:31 PM      Component Results     Component Value Ref Range & Units Status    Troponin I ES <0.015 0.000 - 0.045 ug/L Final    The 99th percentile for upper reference range is 0.045 ug/L.  Troponin values   in the range of 0.045 - 0.120 ug/L may be associated with risks of adverse   clinical events.           1/3/2018  2:27 PM      Component Results     Component Value Ref Range & Units Status    D Dimer <0.3 0.0 - 0.50 ug/ml FEU Final    This D-dimer assay is intended for use in conjunction with a clinical pretest   probability assessment model to exclude pulmonary embolism (PE) and deep    venous thrombosis (DVT) in outpatients suspected of PE or DVT. The cut-off   value is 0.5 ug/mL FEU.           1/3/2018  2:11 PM      Component Results     Component Value Ref Range & Units Status    Troponin I 0.00 0.00 - 0.10 ug/L Final         1/3/2018  3:45 PM      Narrative     CHEST TWO VIEWS  1/3/2018 3:17 PM    HISTORY:  Chest pain.    COMPARISON:  2/4/2013        Impression     IMPRESSION:  Negative.     LISANDRA RAHMAN MD                Clinical Quality Measure: Blood Pressure Screening     Your blood pressure was checked while you were in the emergency department today. The last reading we obtained was  BP: (!) 143/93 . Please read the guidelines below about what these numbers mean and what you should do about them.  If your systolic blood pressure (the top number) is less than 120 and your diastolic blood pressure (the bottom number) is less than 80, then your blood pressure is normal. There is nothing more that you need to do about it.  If your systolic blood pressure (the top number) is 120-139 or your diastolic blood pressure (the bottom number) is 80-89, your blood pressure may be higher than it should be. You should have your blood pressure rechecked within a year by a primary care provider.  If your systolic blood pressure (the top number) is 140 or greater or your diastolic blood pressure (the bottom number) is 90 or greater, you may have high blood pressure. High blood pressure is treatable, but if left untreated over time it can put you at risk for heart attack, stroke, or kidney failure. You should have your blood pressure rechecked by a primary care provider within the next 4 weeks.  If your provider in the emergency department today gave you specific instructions to follow-up with your doctor or provider even sooner than that, you should follow that instruction and not wait for up to 4 weeks for your follow-up visit.        Thank you for choosing [facility]       Thank you for choosing  [facility] for your care. Our goal is always to provide you with excellent care. Hearing back from our patients is one way we can continue to improve our services. Please take a few minutes to complete the written survey that you may receive in the mail after you visit with us. Thank you!        [facility] Information     [facility] gives you secure access to your electronic health record. If you see a primary care provider, you can also send messages to your care team and make appointments. If you have questions, please call your primary care clinic.  If you do not have a primary care provider, please call [phone] and they will assist you.        Care EveryWhere ID     This is your Care EveryWhere ID. This could be used by other organizations to access your [facility] medical records  [id number]        Equal Access to Services     SHELTON CARO : Yolanda Garcia, quirino blevins, jose miller, grieslda rogers. So [facility] [phone].    ATENCIÓN: Si habla español, tiene a ramirez disposición servicios gratuitos de asistencia lingüística. Llame al [phone].    We comply with applicable federal civil rights laws and [place] laws. We do not discriminate on the basis of race, color, national origin, age, disability, sex, sexual orientation, or gender identity.            After Visit Summary       This is your record. Keep this with you and show to your community pharmacist(s) and doctor(s) at your next visit.

## 2018-01-03 NOTE — ED NOTES
Addended: Left chest pain radiates to left jaw. Waxes and wanes but never goes away. Some shortness of breath. History of diabetes.

## 2018-01-03 NOTE — PROGRESS NOTES
SUBJECTIVE:   Latrell Knowles is a 52 year old male who presents to clinic today for the following health issues:      Diabetes Follow-up      Patient is checking blood sugars: one time every other day 120-200    Diabetic concerns: None     Symptoms of hypoglycemia (low blood sugar): none     Paresthesias (numbness or burning in feet) or sores: Yes numbness in feet     Date of last diabetic eye exam: due, will do and fax    Hyperlipidemia Follow-Up      Rate your low fat/cholesterol diet?: good    Taking statin?  Yes, no muscle aches from statin    Other lipid medications/supplements?:  none    Hypertension Follow-up      Outpatient blood pressures are not being checked.    Low Salt Diet: low salt    BP Readings from Last 2 Encounters:   02/13/17 108/66   05/09/16 110/72     Hemoglobin A1C (%)   Date Value   02/13/2017 9.1 (H)   05/09/2016 8.0 (H)     LDL Cholesterol Calculated (mg/dL)   Date Value   05/09/2016 89   05/06/2015 112         Amount of exercise or physical activity: not now, will go back to [facility]    Problems taking medications regularly: No    Medication side effects: none    Diet: healthy diet, low carbs     Reports Dec 31 he was outside his house cleaning his sidewalk for 30 minutes. Came back inside and 2 hours later had chest pain on L side and pain up on L jaw. Felt like pressure/stabbing pain.  Couldn't get comfortable. Tried to relax but didn't help. Just ended up going to bed.Took aspirin and ibuprofen and it helped after 45 minutes. Reduced the pressure. Vomited at 1 AM and 2 times in the morning. A little diarrhea but just once and not like a typical diarrhea problem. After throwing up, felt much better, pain better. Pain finally gone 1/2 by morning, feeling back to normal.     Right now reports both sides of his back hurt. Reports a slight chest symptom now.    Problem list and histories reviewed & adjusted, as indicated.  Additional history: as documented    Patient Active Problem  List   Diagnosis     Type 2 diabetes mellitus with diabetic nephropathy, without long-term current use of insulin (H)     HYPERLIPIDEMIA LDL GOAL <100     CKD (chronic kidney disease) stage 1, GFR 90 ml/min or greater     ED (erectile dysfunction)     Past Surgical History:   Procedure Laterality Date     C LAMINECTOMY,>2 SGMT,LUMBAR  1997       Social History   Substance Use Topics     Smoking status: Never Smoker     Smokeless tobacco: Never Used     Alcohol use Yes      Comment: rarely     Family History   Problem Relation Age of Onset     Type 2 Diabetes Brother      Type 2 Diabetes Mother      age 50's     Type 2 Diabetes Sister      Cancer - colorectal No family hx of          Current Outpatient Prescriptions   Medication Sig Dispense Refill     atorvastatin (LIPITOR) 10 MG tablet Take 1 tablet (10 mg) by mouth daily 90 tablet 3     lisinopril (PRINIVIL/ZESTRIL) 10 MG tablet Take 1 tablet (10 mg) by mouth daily 90 tablet 3     metFORMIN (GLUCOPHAGE-XR) 500 MG 24 hr tablet Take 4 tablets (2,000 mg) by mouth daily (with dinner) (Patient taking differently: Take 1,000 mg by mouth 2 times daily (with meals) ) 360 tablet 3     tadalafil (CIALIS) 20 MG tablet Take 1 tablet (20 mg) by mouth daily as needed for erectile dysfunction 10 tablet 1     gabapentin (NEURONTIN) 300 MG capsule Take 1 capsule (300 mg) by mouth 3 times daily Take 1 tablet (300 mg) every night for 1-3 days, then 1 tablet twice daily for 1-3 days, then 1 tablet three times daily (Patient taking differently: Take 300 mg by mouth 2 times daily Take 1 tablet (300 mg) every night for 1-3 days, then 1 tablet twice daily for 1-3 days, then 1 tablet three times daily) 90 capsule 0     blood glucose monitoring (ACCU-CHEK FASTCLIX) lancets 1 each 4 times daily Use to test blood sugar 4 times daily or as directed. 3 Box 3     blood glucose (ACCU-CHEK SMARTVIEW) test strip 1 strip by In Vitro route 4 times daily (before meals and nightly) Use to test blood  "sugar 4 times daily or as directed. 3 Box prn     ibuprofen (ADVIL,MOTRIN) 800 MG tablet Take 800 mg by mouth every 8 hours as needed for moderate pain       traMADol (ULTRAM) 50 MG tablet Take by mouth every 6 hours as needed Every 4-6 hours as needed       cyclobenzaprine (FLEXERIL) 10 MG tablet Take 10 mg by mouth 3 times daily as needed 1 tab bid       aspirin 81 MG tablet Take 324 mg by mouth once       Recent Labs   Lab Test  01/03/18   1350  01/03/18   1121  02/13/17   1427  05/09/16   0906   05/06/15   1802   A1C   --   8.6*  9.1*  8.0*   < >  14.4*   LDL   --   76   --   89   --   112   HDL   --   54   --   48   --   64   TRIG   --   134   --   211*   --   155*   ALT   --   31  28  26   < >  27   CR  0.62*  0.67  0.72  0.67   < >  0.63*   GFRESTIMATED  >90  >90  >90  Non African American GFR Calc    >90  Non  GFR Calc     < >  >90  Non  GFR Calc     GFRESTBLACK  >90  >90  >90  African American GFR Calc    >90   GFR Calc     < >  >90   GFR Calc     POTASSIUM  4.0  4.6  5.3  5.2   < >  4.2   TSH   --   1.26   --    --    --   1.74    < > = values in this interval not displayed.      BP Readings from Last 3 Encounters:   01/03/18 (!) 145/96   01/03/18 120/70   02/13/17 108/66    Wt Readings from Last 3 Encounters:   01/03/18 174 lb (78.9 kg)   01/03/18 174 lb 4.8 oz (79.1 kg)   02/13/17 175 lb 6.4 oz (79.6 kg)            Reviewed and updated as needed this visit by clinical staff     Reviewed and updated as needed this visit by Provider         ROS:  Constitutional, HEENT, cardiovascular, pulmonary, GI, , musculoskeletal, neuro, skin, endocrine and psych systems are negative, except as otherwise noted.      OBJECTIVE:   /70 (Cuff Size: Adult Regular)  Pulse 62  Temp 97.7  F (36.5  C) (Oral)  Ht 5' 8.7\" (1.745 m)  Wt 174 lb 4.8 oz (79.1 kg)  SpO2 98%  BMI 25.96 kg/m2  Body mass index is 25.96 kg/(m^2).  GENERAL: healthy, alert and no " distress  NECK: no adenopathy, no asymmetry, masses, or scars and thyroid normal to palpation  RESP: lungs clear to auscultation - no rales, rhonchi or wheezes  CV: regular rate and rhythm, normal S1 S2, no S3 or S4, no murmur, click or rub, no peripheral edema and peripheral pulses strong  ABDOMEN: soft, nontender, no hepatosplenomegaly, no masses and bowel sounds normal  MS: no gross musculoskeletal defects noted, no edema    Diagnostic Test Results:  Results for orders placed or performed in visit on 01/03/18   HEMOGLOBIN A1C   Result Value Ref Range    Hemoglobin A1C 8.6 (H) 4.3 - 6.0 %   Lipid panel reflex to direct LDL Fasting   Result Value Ref Range    Cholesterol 157 <200 mg/dL    Triglycerides 134 <150 mg/dL    HDL Cholesterol 54 >39 mg/dL    LDL Cholesterol Calculated 76 <100 mg/dL    Non HDL Cholesterol 103 <130 mg/dL   TSH WITH FREE T4 REFLEX   Result Value Ref Range    TSH 1.26 0.40 - 4.00 mU/L   Comprehensive metabolic panel   Result Value Ref Range    Sodium 138 133 - 144 mmol/L    Potassium 4.6 3.4 - 5.3 mmol/L    Chloride 102 94 - 109 mmol/L    Carbon Dioxide 27 20 - 32 mmol/L    Anion Gap 9 3 - 14 mmol/L    Glucose 202 (H) 70 - 99 mg/dL    Urea Nitrogen 14 7 - 30 mg/dL    Creatinine 0.67 0.66 - 1.25 mg/dL    GFR Estimate >90 >60 mL/min/1.7m2    GFR Estimate If Black >90 >60 mL/min/1.7m2    Calcium 8.8 8.5 - 10.1 mg/dL    Bilirubin Total 0.5 0.2 - 1.3 mg/dL    Albumin 3.9 3.4 - 5.0 g/dL    Protein Total 7.4 6.8 - 8.8 g/dL    Alkaline Phosphatase 108 40 - 150 U/L    ALT 31 0 - 70 U/L    AST 32 0 - 45 U/L     EKG - NSR, no acute ST/T changes    ASSESSMENT/PLAN:     1. Chest pressure  Concerning episode of chest pressure starting several days ago associated with nausea and vomiting, radiating to jaw. Still with very mild, more atypical symptoms. I have recommended further evaluation in the ER. Initially declining to go because he wasn't sure he had coverage. We were able to assist him in sorting this  out so he would agree to go. Declined ambulance. Drove here this morning from [place].  Discussed risk of return of his symptoms during drive, and potential for death if worsening while driving. He demonstrates understanding.   - EKG 12-lead complete w/read - Clinics    2. Uncontrolled type 2 diabetes mellitus with diabetic polyneuropathy, without long-term current use of insulin (H)    - HEMOGLOBIN A1C  - Lipid panel reflex to direct LDL Fasting  - TSH WITH FREE T4 REFLEX  - Comprehensive metabolic panel    I spent 25 minutes with this patient face-to-face, of which 50% or greater was spent in counseling and coordination of care with regards to chest pain, further evaluation and recommendations. Please see plan above.      Jo Recinos MD  [facility]

## 2018-01-03 NOTE — DISCHARGE INSTRUCTIONS
Please follow up closely with your regular physician and the stress test.       Please return to the ED if your symptoms worsen or if you develop new or concerning symptoms.     Discharge Instructions  Chest Pain    You have been seen today for chest pain or discomfort.  At this time, your doctor has found no signs that your chest pain is due to a serious or life-threatening condition, (or you have declined more testing and/or admission to the hospital). However, sometimes there is a serious problem that does not show up right away. Your evaluation today may not be complete and you may need further testing and evaluation.     You need to follow-up with your regular doctor within 3 days.    Return to the Emergency Department if:    Your chest pain changes, gets worse, starts to happen more often, or comes with less activity.    You are short of breath.    You get very weak or tired.    You pass out or faint.    You have any new symptoms, like fever, cough, numb legs, or you cough up blood.    You have anything else that worries you.    Until you follow-up with your regular doctor please do the following:    Take one aspirin daily unless you have an allergy or are told not to by your doctor.    If a stress test appointment has been made, go to the appointment.    If you have questions, contact your regular doctor.    If your doctor today has told you to follow-up with your regular doctor, it is very important that you make an appointment with your clinic and go to the appointment.  If you do not follow-up with your primary doctor, it may result in missing an important development which could result in permanent injury or disability and/or lasting pain.  If there is any problem keeping your appointment, call your doctor or return to the Emergency Department.    If you were given a prescription for medicine here today, be sure to read all of the information (including the package insert) that comes with your  prescription.  This will include important information about the medicine, its side effects, and any warnings that you need to know about.  The pharmacist who fills the prescription can provide more information and answer questions you may have about the medicine.  If you have questions or concerns that the pharmacist cannot address, please call or return to the Emergency Department.     Opioid Medication Information    Pain medications are among the most commonly prescribed medicines, so we are including this information for all our patients. If you did not receive pain medication or get a prescription for pain medicine, you can ignore it.     You may have been given a prescription for an opioid (narcotic) pain medicine and/or have received a pain medicine while here in the Emergency Department. These medicines can make you drowsy or impaired. You must not drive, operate dangerous equipment, or engage in any other dangerous activities while taking these medications. If you drive while taking these medications, you could be arrested for DUI, or driving under the influence. Do not drink any alcohol while you are taking these medications.     Opioid pain medications can cause addiction. If you have a history of chemical dependency of any type, you are at a higher risk of becoming addicted to pain medications.  Only take these prescribed medications to treat your pain when all other options have been tried. Take it for as short a time and as few doses as possible. Store your pain pills in a secure place, as they are frequently stolen and provide a dangerous opportunity for children or visitors in your house to start abusing these powerful medications. We will not replace any lost or stolen medicine.  As soon as your pain is better, you should flush all your remaining medication.     Many prescription pain medications contain Tylenol  (acetaminophen), including Vicodin , Tylenol #3 , Norco , Lortab , and Percocet .  You  should not take any extra pills of Tylenol  if you are using these prescription medications or you can get very sick.  Do not ever take more than 3000 mg of acetaminophen in any 24 hour period.    All opioids tend to cause constipation. Drink plenty of water and eat foods that have a lot of fiber, such as fruits, vegetables, prune juice, apple juice and high fiber cereal.  Take a laxative if you don t move your bowels at least every other day. Miralax , Milk of Magnesia, Colace , or Senna  can be used to keep you regular.      Remember that you can always come back to the Emergency Department if you are not able to see your regular doctor in the amount of time listed above, if you get any new symptoms, or if there is anything that worries you.

## 2018-01-03 NOTE — ED PROVIDER NOTES
"  History     Chief Complaint:  Chest Pain     HPI   Latrell Knowles is a 52 year old male, with a history of diabetes and hyperlipidemia, who presents with chest pain. The patient states that he had an onset of chest pain approximately 3 day ago located on the left side of his chest that waxes and wanes in severity but has been constant since onset. Initially, he denied any radiation of his symptoms to his jaw or arm. However, he then did mention a short episode of pain to a single spot on his L jaw, but notes this resolved. He had no resolution in his symptoms with using aspirin, prompting him to visit his clinic this morning. They had referred him to the ED for further evaluation. Here, the patient additionally notes that he feels increased pain with deep breaths. He denies nausea. He denies recent illness, fever, cough, shortness of breath, history of CAD, history of PE, or other concerns/complaints at this time.     Allergies:  NKDA     Medications:    Lipitor  Lisinopril  Metformin  Cialis  Neurontin  Ultram  Flexeril    Past Medical History:    Disc disease, degenerative, lumbar or lumbosacral  Post Laminectomy syndrome  Type 2 diabetes  Hyperlipidemia  CKD    Past Surgical History:    Laminectomy    Family History:    Type 2 Diabetes    Social History:  Positive for alcohol use.   Negative for tobacco use.   Marital Status:   [2]     Review of Systems   Constitutional: Negative for fever.   Respiratory: Negative for shortness of breath.    Cardiovascular: Positive for chest pain.   Gastrointestinal: Negative for nausea.   All other systems reviewed and are negative.      Physical Exam   First Vitals:   BP: (!) 148/94  Pulse: 53  Heart Rate: 53  Temp: 97.6  F (36.4  C)  Resp: 16  Height: 177.8 cm (5' 10\")  Weight: 78.9 kg (174 lb)  SpO2: 100 %    Physical Exam  Constitutional: The patient is oriented to person, place, and time. Alert and cooperative.  HENT:   Right Ear: External ear normal. "   Left Ear: External ear normal.   Nose: Nose normal.   Mouth/Throat: Uvula is midline, oropharynx is clear and moist and mucous membranes are normal. No posterior oropharyngeal edema or erythema.   Eyes: Conjunctivae, EOM and lids are normal. Pupils are equal, round, and reactive to light.   Neck: Trachea normal. Normal range of motion. Neck supple.   Cardiovascular: bradycardia, regular rhythm, normal heart sounds, and intact distal pulses.    Pulmonary/Chest: Effort normal and breath sounds equal bilaterally. No crackles or wheezing.   Abdominal: Soft. No tenderness. No rebound and no guarding.   Musculoskeletal: Normal range of motion.  No extremity tenderness or edema.  Neurological: Alert and Oriented. Strength 5/5 in upper and lower extremities bilaterally. Sensation intact to light touch throughout.  Skin: Skin is dry. No rash noted.          Emergency Department Course   ECG:  Indication: Chest Pain  Time: 1349  Vent. Rate 52 bpm. NE interval 140. QRS duration 92. QT/QTc 442/411. P-R-T axis 66 78 57. Sinus bradycardia. Otherwise normal ECG. Read time: 1405     Imaging:  Radiographic findings were communicated with the patient who voiced understanding of the findings.  XR Chest 2 views:   Negative.  As per radiology.     Laboratory:  CBC: WBC: 5.0, HGB: 15.8, PLT: 180  BMP: Glucose 181 (H), Creatinine: 0.62 (L), o/w WNL     1350 Troponin: <0.015  1355 Troponin POCT: 0.00  D Dimer: <0.3    Interventions:   1416 Dilaudid, 0.5 mg, IV  1416 0.9% Sodium Chloride Bolus, 1L, IV     Emergency Department Course:  Nursing notes and vitals reviewed.     1358  I performed an exam of the patient as documented above.     IV inserted. Medicine administered as documented above. Blood drawn. This was sent to the lab for further testing, results above.    The patient was sent for a chest XR while in the emergency department, findings above.     1630 I rechecked the patient and discussed the results of his workup thus far.      Findings and plan explained to the Patient. Patient discharged home with instructions regarding supportive care, medications, and reasons to return. The importance of close follow-up was reviewed.    I personally reviewed the laboratory results with the Patient and answered all related questions prior to discharge.    Impression & Plan      Medical Decision Making:  Lartell Knowles is a 52 year old male with a history of diabetes and hyperlipidemia, who presents to the ED for evaluation of chest pain. Upon presentation to the ED, the patient is nontoxic appearing. He is mildly bradycardic and hypertensive, though his vital signs are otherwise within normal limits and stable. On exam, he is well appearing. He is alert, oriented, and neuro exam is nonfocal. Cardiopulmonary exam is unremarkable. Abdomen is soft and nontender throughout. The rest of his exam is as mentioned above. Differential includes, but is not limited to, ACS, PE, pneumonia, pneumothorax, esophageal rupture, GERD, pericarditis, or musculoskeletal chest wall pain. EKG was obtained and demonstrates sinus rhythm with no concerning acute ischemic changes. Repeat EKG was obtained throughout the patient's ED stay and remains well appearing without concerning acute ischemic changes. Labs were obtained and are as mentioned above. Chest XR was obtained and demonstrates no evidence of an acute cardiopulmonary processes. Given the unremarkable chest XR, I have low suspicion for pneumonia or pneumothorax. His history and presentation are not consistent with aortic dissection, esophageal rupture, or pericarditis, therefore, I feel that these diagnoses are less likely. The patient is at low risk for PE by Wells' criteria with a negative D Dimer, PE is less likely and further evaluation for this is not indicated at this time. Given the unremarkable EKG and negative troponin despite greater than 6 hours of symptoms, this essentially rules out ACS. The  patient is at low risk for ACS by Heart Score, therefore I do feel he is safe for discharge home with close follow up with his primary care physician and an outpatient stress test. I discussed this thoroughly with the patient and he notes understanding and agreement. On my repeat evaluation, the patient does note complete resolution of symptoms. Return instructions were given. He was stable/improved at the time of discharge.     Diagnosis:    ICD-10-CM    1. Chest pain, unspecified type R07.9 Exercise Stress Echocardiogram       Disposition:  discharged to home    IShaylee am serving as a scribe on 1/3/2018 at 1:58 PM to personally document services performed by Mayela Lee MD based on my observations and the provider's statements to me.      Shaylee Araiza  1/3/2018   [facility] EMERGENCY DEPARTMENT       Mayela Lee MD  01/04/18 1829

## 2018-01-04 LAB
ALBUMIN SERPL-MCNC: 3.9 G/DL (ref 3.4–5)
ALP SERPL-CCNC: 108 U/L (ref 40–150)
ALT SERPL W P-5'-P-CCNC: 31 U/L (ref 0–70)
ANION GAP SERPL CALCULATED.3IONS-SCNC: 9 MMOL/L (ref 3–14)
AST SERPL W P-5'-P-CCNC: 32 U/L (ref 0–45)
BILIRUB SERPL-MCNC: 0.5 MG/DL (ref 0.2–1.3)
BUN SERPL-MCNC: 14 MG/DL (ref 7–30)
CALCIUM SERPL-MCNC: 8.8 MG/DL (ref 8.5–10.1)
CHLORIDE SERPL-SCNC: 102 MMOL/L (ref 94–109)
CHOLEST SERPL-MCNC: 157 MG/DL
CO2 SERPL-SCNC: 27 MMOL/L (ref 20–32)
CREAT SERPL-MCNC: 0.67 MG/DL (ref 0.66–1.25)
GFR SERPL CREATININE-BSD FRML MDRD: >90 ML/MIN/1.7M2
GLUCOSE SERPL-MCNC: 202 MG/DL (ref 70–99)
HDLC SERPL-MCNC: 54 MG/DL
INTERPRETATION ECG - MUSE: NORMAL
LDLC SERPL CALC-MCNC: 76 MG/DL
NONHDLC SERPL-MCNC: 103 MG/DL
POTASSIUM SERPL-SCNC: 4.6 MMOL/L (ref 3.4–5.3)
PROT SERPL-MCNC: 7.4 G/DL (ref 6.8–8.8)
SODIUM SERPL-SCNC: 138 MMOL/L (ref 133–144)
TRIGL SERPL-MCNC: 134 MG/DL
TSH SERPL DL<=0.005 MIU/L-ACNC: 1.26 MU/L (ref 0.4–4)

## 2018-01-04 ASSESSMENT — ENCOUNTER SYMPTOMS
SHORTNESS OF BREATH: 0
FEVER: 0

## 2018-01-05 ENCOUNTER — TELEPHONE (OUTPATIENT)
Dept: PEDIATRICS | Facility: CLINIC | Age: 53
End: 2018-01-05

## 2018-01-05 DIAGNOSIS — R07.9 CHEST PAIN, UNSPECIFIED TYPE: Primary | ICD-10-CM

## 2018-01-05 NOTE — TELEPHONE ENCOUNTER
Call from [facility]. Unable to walk on treadmill for stress echo. Switch to [place]. Order placed. They will attempt to still do today.  Jo Rosado M.D.

## 2018-01-15 ENCOUNTER — TELEPHONE (OUTPATIENT)
Dept: PEDIATRICS | Facility: CLINIC | Age: 53
End: 2018-01-15

## 2018-02-13 ENCOUNTER — HOSPITAL ENCOUNTER (OUTPATIENT)
Dept: CARDIOLOGY | Facility: CLINIC | Age: 53
Discharge: HOME | End: 2018-02-13
Attending: INTERNAL MEDICINE | Admitting: INTERNAL MEDICINE
Payer: COMMERCIAL

## 2018-02-13 ENCOUNTER — HOSPITAL ENCOUNTER (OUTPATIENT)
Dept: NUCLEAR MEDICINE | Facility: CLINIC | Age: 53
Setting detail: NUCLEAR MEDICINE
Discharge: HOME | End: 2018-02-13
Attending: INTERNAL MEDICINE | Admitting: INTERNAL MEDICINE
Payer: COMMERCIAL

## 2018-02-13 VITALS — DIASTOLIC BLOOD PRESSURE: 76 MMHG | SYSTOLIC BLOOD PRESSURE: 117 MMHG

## 2018-02-13 DIAGNOSIS — R07.9 CHEST PAIN, UNSPECIFIED TYPE: ICD-10-CM

## 2018-02-13 PROCEDURE — 34300033 ZZH RX 343: Performed by: INTERNAL MEDICINE

## 2018-02-13 PROCEDURE — 93017 CV STRESS TEST TRACING ONLY: CPT

## 2018-02-13 PROCEDURE — 93018 CV STRESS TEST I&R ONLY: CPT | Performed by: INTERNAL MEDICINE

## 2018-02-13 PROCEDURE — A9502 TC99M TETROFOSMIN: HCPCS | Performed by: INTERNAL MEDICINE

## 2018-02-13 PROCEDURE — 78452 HT MUSCLE IMAGE SPECT MULT: CPT

## 2018-02-13 PROCEDURE — 93016 CV STRESS TEST SUPVJ ONLY: CPT | Performed by: INTERNAL MEDICINE

## 2018-02-13 PROCEDURE — 78452 HT MUSCLE IMAGE SPECT MULT: CPT | Mod: 26 | Performed by: INTERNAL MEDICINE

## 2018-02-13 RX ORDER — REGADENOSON 0.08 MG/ML
0.4 INJECTION, SOLUTION INTRAVENOUS ONCE
Status: COMPLETED | OUTPATIENT
Start: 2018-02-13 | End: 2018-02-13

## 2018-02-13 RX ORDER — REGADENOSON 0.08 MG/ML
INJECTION, SOLUTION INTRAVENOUS
Status: DISCONTINUED
Start: 2018-02-13 | End: 2018-02-14 | Stop reason: HOSPADM

## 2018-02-13 RX ADMIN — TETROFOSMIN 11 MCI.: 1.38 INJECTION, POWDER, LYOPHILIZED, FOR SOLUTION INTRAVENOUS at 10:50

## 2018-02-13 RX ADMIN — TETROFOSMIN 30.5 MCI.: 1.38 INJECTION, POWDER, LYOPHILIZED, FOR SOLUTION INTRAVENOUS at 12:19

## 2018-02-13 RX ADMIN — REGADENOSON 0.4 MG: 0.08 INJECTION, SOLUTION INTRAVENOUS at 12:35

## 2018-02-13 NOTE — PROGRESS NOTES
Pre-procedure: NO COMPLAINTS    Initial vital signs: /76, HR 70, RR 20  Allergies reviewed: YES   Rhythm: Sinus  Medications taken within 48 hours of procedure: DENIES   Last Caffeine: 2/12/18  Lung sounds: CTA, no wheezing, crackles or rtx  Health History (COPD, Asthma, etc): DENIES    Procedure: Lexiscan  Reaction/symptoms after receiving Steffanie injection: GI upset  Intensity of Pain: NONE  Rhythm:   1. Vital Signs:BP 98/68, , RR 20  2. Vital Signs:/72, HR 94, RR 20     Reversal agent: COLA AND CRACKERS    Post:   Resolution of symptoms?: YES  Vital signs: /75, HR 93, RR 20    Rhythm: SINUS  Walk: NO  Comment: TOLERATE COLA   Return to Radiology

## 2018-05-03 DIAGNOSIS — R80.9 MICROALBUMINURIA DUE TO TYPE 2 DIABETES MELLITUS (H): ICD-10-CM

## 2018-05-03 DIAGNOSIS — E11.29 MICROALBUMINURIA DUE TO TYPE 2 DIABETES MELLITUS (H): ICD-10-CM

## 2018-05-03 NOTE — TELEPHONE ENCOUNTER
"Requested Prescriptions   Pending Prescriptions Disp Refills     lisinopril (PRINIVIL/ZESTRIL) 10 MG tablet [Pharmacy Med Name: Lisinopril Oral Tablet 10 MG]    Last Written Prescription Date:  2/13/2017  Last Fill Quantity: 90,  # refills: 3   Last office visit: 1/3/2018 with prescribing provider:  Jo Rosado     Future Office Visit:     30 tablet 2     Sig: TAKE ONE TABLET BY MOUTH ONE TIME DAILY    ACE Inhibitors (Including Combos) Protocol Failed    5/3/2018 10:14 AM       Failed - Normal serum creatinine on file in past 12 months    Recent Labs   Lab Test  01/03/18   1350   CR  0.62*            Passed - Blood pressure under 140/90 in past 12 months    BP Readings from Last 3 Encounters:   02/13/18 117/76   01/03/18 (!) 145/96   01/03/18 120/70                Passed - Recent (12 mo) or future (30 days) visit within the authorizing provider's specialty    Patient had office visit in the last 12 months or has a visit in the next 30 days with authorizing provider or within the authorizing provider's specialty.  See \"Patient Info\" tab in inbasket, or \"Choose Columns\" in Meds & Orders section of the refill encounter.           Passed - Patient is age 18 or older       Passed - Normal serum potassium on file in past 12 months    Recent Labs   Lab Test  01/03/18   1350   POTASSIUM  4.0                 "

## 2018-05-04 NOTE — TELEPHONE ENCOUNTER
Routing refill request to provider for review/approval because:  Labs out of range:  CR    Sangeeta Quintero RN   [facility]

## 2018-05-07 RX ORDER — LISINOPRIL 10 MG/1
TABLET ORAL
Qty: 30 TABLET | Refills: 2 | Status: SHIPPED | OUTPATIENT
Start: 2018-05-07 | End: 2018-06-27 | Stop reason: DRUGHIGH

## 2018-06-25 ENCOUNTER — APPOINTMENT (OUTPATIENT)
Dept: OPTOMETRY | Facility: CLINIC | Age: 53
End: 2018-06-25
Payer: COMMERCIAL

## 2018-06-25 ENCOUNTER — OFFICE VISIT (OUTPATIENT)
Dept: OPTOMETRY | Facility: CLINIC | Age: 53
End: 2018-06-25
Payer: COMMERCIAL

## 2018-06-25 DIAGNOSIS — H52.01 HYPERMETROPIA OF RIGHT EYE: Primary | ICD-10-CM

## 2018-06-25 DIAGNOSIS — H52.02 HYPEROPIA OF LEFT EYE WITH ASTIGMATISM: ICD-10-CM

## 2018-06-25 DIAGNOSIS — H52.4 PRESBYOPIA: ICD-10-CM

## 2018-06-25 DIAGNOSIS — H52.202 HYPEROPIA OF LEFT EYE WITH ASTIGMATISM: ICD-10-CM

## 2018-06-25 DIAGNOSIS — E11.9 DIABETES MELLITUS WITHOUT OPHTHALMIC MANIFESTATIONS (H): ICD-10-CM

## 2018-06-25 PROCEDURE — 92341 FIT SPECTACLES BIFOCAL: CPT | Performed by: OPTOMETRIST

## 2018-06-25 PROCEDURE — 92015 DETERMINE REFRACTIVE STATE: CPT | Performed by: OPTOMETRIST

## 2018-06-25 PROCEDURE — 92004 COMPRE OPH EXAM NEW PT 1/>: CPT | Performed by: OPTOMETRIST

## 2018-06-25 ASSESSMENT — REFRACTION_MANIFEST
METHOD_AUTOREFRACTION: 1
OS_SPHERE: +0.75
OD_CYLINDER: +0.25
OD_CYLINDER: SPHERE
OD_AXIS: 124
OS_SPHERE: PLANO
OS_AXIS: 52
OS_AXIS: 048
OD_SPHERE: +0.50
OS_CYLINDER: +0.75
OD_ADD: +2.25
OS_ADD: +2.25
OS_CYLINDER: +0.25
OD_SPHERE: +1.00

## 2018-06-25 ASSESSMENT — VISUAL ACUITY
OD_SC: 20/25
METHOD: SNELLEN - LINEAR
OD_SC+: -1
OS_SC+: -2
OS_SC: 20/60
OS_SC: 20/25
OD_SC: 20/80

## 2018-06-25 ASSESSMENT — CONF VISUAL FIELD
OS_NORMAL: 1
OD_NORMAL: 1

## 2018-06-25 ASSESSMENT — TONOMETRY
OS_IOP_MMHG: 16
OD_IOP_MMHG: 16
IOP_METHOD: APPLANATION

## 2018-06-25 ASSESSMENT — CUP TO DISC RATIO
OS_RATIO: 0.25
OD_RATIO: 0.25

## 2018-06-25 ASSESSMENT — EXTERNAL EXAM - LEFT EYE: OS_EXAM: NORMAL

## 2018-06-25 ASSESSMENT — REFRACTION_WEARINGRX
OD_SPHERE: +1.50
OS_SPHERE: +1.50

## 2018-06-25 ASSESSMENT — EXTERNAL EXAM - RIGHT EYE: OD_EXAM: NORMAL

## 2018-06-25 ASSESSMENT — SLIT LAMP EXAM - LIDS
COMMENTS: NORMAL
COMMENTS: NORMAL

## 2018-06-25 NOTE — LETTER
6/25/2018         RE: Latrell Knowles  [address] [address]        Dear Colleague,    Thank you for referring your patient, Latrell Knowles, to the [facility] [place]. Please see a copy of my visit note below.    Chief Complaint   Patient presents with     Diabetic Eye Exam     Routine   diabetic x 6 y  R eye irritated since mowing grass, felt like something went in the eye  Vision gets blurrier when glucose is not controlled     Lab Results   Component Value Date    A1C 8.6 01/03/2018    A1C 9.1 02/13/2017    A1C 8.0 05/09/2016    A1C 6.7 08/10/2015    A1C 7.7 07/14/2015       Last Eye Exam: 3yrs  Dilated Previously: Yes    What are you currently using to see?  readers    Distance Vision Acuity: Satisfied with vision    Near Vision Acuity: Not satisfied     Eye Comfort: good  Do you use eye drops? : No  Occupation or Hobbies: Everyday         Medical, surgical and family histories reviewed and updated 6/25/2018.       OBJECTIVE: See Ophthalmology exam    ASSESSMENT:    ICD-10-CM    1. 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 Hyperopia of left eye with astigmatism H52.02     H52.202    3. Diabetes mellitus without ophthalmic manifestations (H) E11.9    4. Presbyopia H52.4       PLAN:    Latrell Knowles aware  eye exam results will be sent to Jo Rosado  Artificial tears  OD   Bifocal       Jodee Barros OD         Again, thank you for allowing me to participate in the care of your patient.        Sincerely,        Jodee Barros, OD

## 2018-06-25 NOTE — MR AVS SNAPSHOT
After Visit Summary   6/25/2018    Latrell Knowles    MRN: [id number]           Patient Information     Date Of Birth          1965        Visit Information        Provider Department      6/25/2018 11:00 AM Jodee Barros OD [facility] [place]        Today's Diagnoses     Hypermetropia of right eye    -  1    Hyperopia of left eye with astigmatism        Diabetes mellitus without ophthalmic manifestations (H)        Presbyopia          Care Instructions    You scratched the R eye mowing, very small , use blink artificial tears  In there 4x daily or as needed the next couple of days and should resolve    Diabetes weakens the blood vessels all over the body, including the eyes. Damage to the blood vessels in the eyes can cause swelling or bleeding into part of the eye (called the retina). This is called diabetic retinopathy (ESTEFANIA-tin-AH-puh-thee). If not treated, this disease can cause vision loss or blindness.   Symptoms may include blurred or distorted vision, but many people have no symptoms. It's important to see your eye doctor regularly to check for problems.   Early treatment and good control can help protect your vision. Here are the things you can do to help prevent vision loss:      1. Keep your blood sugar levels under tight control.      2. Bring high blood pressure under control.      3. No smoking.      4. Have yearly dilated eye exams.     You have a mild prescription for distance vision, so consider bifocal prescription   There are no complications due to your diabetes             Follow-ups after your visit        Follow-up notes from your care team     Return in about 1 year (around 6/25/2019).      Your next 10 appointments already scheduled     Jun 27, 2018 11:10 AM CDT   Office Visit with Jo Rosado MD   [facility] [place] ([facility] [place]    [address] [place] [address]  [address] [facility]  [place] [address]   [phone]           Bring a  current list of meds and any records pertaining to this visit. For Physicals, please bring immunization records and any forms needing to be filled out. Please arrive 10 minutes early to complete paperwork.              Who to contact     If you have questions or need follow up information about today's clinic visit or your schedule please contact [facility] [place] directly at [phone].  Normal or non-critical lab and imaging results will be communicated to you by [employer], letter or phone within 4 business days after the clinic has received the results. If you do not hear from us within 7 days, please contact the clinic through [employer] or phone. If you have a critical or abnormal lab result, we will notify you by phone as soon as possible.  Submit refill requests through [employer] or call your pharmacy and they will forward the refill request to us. Please allow 3 business days for your refill to be completed.          Additional Information About Your Visit        [email or web address] Information     [employer] gives you secure access to your electronic health record. If you see a primary care provider, you can also send messages to your care team and make appointments. If you have questions, please call your primary care clinic.  If you do not have a primary care provider, please call [phone] and they will assist you.        Care EveryWhere ID     This is your Care EveryWhere ID. This could be used by other organizations to access your [place] medical records  [id number]         Blood Pressure from Last 3 Encounters:   02/13/18 117/76   01/03/18 (!) 145/96   01/03/18 120/70    Weight from Last 3 Encounters:   01/03/18 78.9 kg (174 lb)   01/03/18 79.1 kg (174 lb 4.8 oz)   02/13/17 79.6 kg (175 lb 6.4 oz)              We Performed the Following     EYE EXAM (SIMPLE-NONBILLABLE)     REFRACTION          Today's Medication Changes          These changes are accurate as of 6/25/18 11:32 AM.  If you have any questions, ask  your nurse or doctor.               These medicines have changed or have updated prescriptions.        Dose/Directions    gabapentin 300 MG capsule   Commonly known as:  NEURONTIN   This may have changed:    - when to take this  - additional instructions   Used for:  Chronic low back pain        Dose:  300 mg   Take 1 capsule (300 mg) by mouth 3 times daily Take 1 tablet (300 mg) every night for 1-3 days, then 1 tablet twice daily for 1-3 days, then 1 tablet three times daily   Quantity:  90 capsule   Refills:  0       metFORMIN 500 MG 24 hr tablet   Commonly known as:  GLUCOPHAGE-XR   This may have changed:    - how much to take  - when to take this   Used for:  Type 2 diabetes mellitus with diabetic nephropathy, without long-term current use of insulin (H)        Dose:  2000 mg   Take 4 tablets (2,000 mg) by mouth daily (with dinner)   Quantity:  360 tablet   Refills:  3                Primary Care Provider Office Phone # Fax #    Jo Rosado [phone] [address] [place] [address]        Equal Access to Services     [facility] AH: Hadii tammy mansfield hadasho Soomaali, waaxda luqadaha, qaybta kaalmada adeegyada, waxay markel amaro . So [facility] [phone].    ATENCIÓN: Si habla español, tiene a ramirez disposición servicios gratuitos de asistencia lingüística. [facility] [phone].    We comply with applicable federal civil rights laws and [place] laws. We do not discriminate on the basis of race, color, national origin, age, disability, sex, sexual orientation, or gender identity.            Thank you!     Thank you for choosing [facility] [place]  for your care. Our goal is always to provide you with excellent care. Hearing back from our patients is one way we can continue to improve our services. Please take a few minutes to complete the written survey that you may receive in the mail after your visit with us. Thank you!             Your Updated  Medication List - Protect others around you: Learn how to safely use, store and throw away your medicines at www.disposemymeds.org.          This list is accurate as of 6/25/18 11:32 AM.  Always use your most recent med list.                   Brand Name Dispense Instructions for use Diagnosis    aspirin 81 MG tablet      Take 324 mg by mouth once        atorvastatin 10 MG tablet    LIPITOR    90 tablet    Take 1 tablet (10 mg) by mouth daily    Type 2 diabetes mellitus with diabetic nephropathy, without long-term current use of insulin (H)       blood glucose monitoring lancets     3 Box    1 each 4 times daily Use to test blood sugar 4 times daily or as directed.    Type 2 diabetes, HbA1c goal < 7% (H)       blood glucose monitoring test strip    ACCU-CHEK SMARTVIEW    3 Box    1 strip by In Vitro route 4 times daily (before meals and nightly) Use to test blood sugar 4 times daily or as directed.    Type 2 diabetes, HbA1c goal < 7% (H)       cyclobenzaprine 10 MG tablet    FLEXERIL     Take 10 mg by mouth 3 times daily as needed 1 tab bid    ED (erectile dysfunction)       gabapentin 300 MG capsule    NEURONTIN    90 capsule    Take 1 capsule (300 mg) by mouth 3 times daily Take 1 tablet (300 mg) every night for 1-3 days, then 1 tablet twice daily for 1-3 days, then 1 tablet three times daily    Chronic low back pain       ibuprofen 800 MG tablet    ADVIL/MOTRIN     Take 800 mg by mouth every 8 hours as needed for moderate pain        lisinopril 10 MG tablet    PRINIVIL/ZESTRIL    30 tablet    TAKE ONE TABLET BY MOUTH ONE TIME DAILY    Microalbuminuria due to type 2 diabetes mellitus (H)       metFORMIN 500 MG 24 hr tablet    GLUCOPHAGE-XR    360 tablet    Take 4 tablets (2,000 mg) by mouth daily (with dinner)    Type 2 diabetes mellitus with diabetic nephropathy, without long-term current use of insulin (H)       tadalafil 20 MG tablet    CIALIS    10 tablet    Take 1 tablet (20 mg) by mouth daily as needed for  erectile dysfunction    Other male erectile dysfunction       traMADol 50 MG tablet    ULTRAM     Take by mouth every 6 hours as needed Every 4-6 hours as needed    ED (erectile dysfunction)

## 2018-06-25 NOTE — PROGRESS NOTES
Chief Complaint   Patient presents with     Diabetic Eye Exam     Routine   diabetic x 6 y  R eye irritated since mowing grass, felt like something went in the eye  Vision gets blurrier when glucose is not controlled     Lab Results   Component Value Date    A1C 8.6 01/03/2018    A1C 9.1 02/13/2017    A1C 8.0 05/09/2016    A1C 6.7 08/10/2015    A1C 7.7 07/14/2015       Last Eye Exam: 3yrs  Dilated Previously: Yes    What are you currently using to see?  readers    Distance Vision Acuity: Satisfied with vision    Near Vision Acuity: Not satisfied     Eye Comfort: good  Do you use eye drops? : No  Occupation or Hobbies: Everyday         Medical, surgical and family histories reviewed and updated 6/25/2018.       OBJECTIVE: See Ophthalmology exam    ASSESSMENT:    ICD-10-CM    1. 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 Hyperopia of left eye with astigmatism H52.02     H52.202    3. Diabetes mellitus without ophthalmic manifestations (H) E11.9    4. Presbyopia H52.4       PLAN:    Latrell Knowles aware  eye exam results will be sent to Jo Rosado  Artificial tears  OD   Bifocal       Jodee Barros OD

## 2018-06-25 NOTE — PATIENT INSTRUCTIONS
You scratched the R eye mowing, very small , use blink artificial tears  In there 4x daily or as needed the next couple of days and should resolve    Diabetes weakens the blood vessels all over the body, including the eyes. Damage to the blood vessels in the eyes can cause swelling or bleeding into part of the eye (called the retina). This is called diabetic retinopathy (ESTEFANIA-tin-AH-puh-thee). If not treated, this disease can cause vision loss or blindness.   Symptoms may include blurred or distorted vision, but many people have no symptoms. It's important to see your eye doctor regularly to check for problems.   Early treatment and good control can help protect your vision. Here are the things you can do to help prevent vision loss:      1. Keep your blood sugar levels under tight control.      2. Bring high blood pressure under control.      3. No smoking.      4. Have yearly dilated eye exams.     You have a mild prescription for distance vision, so consider bifocal prescription   There are no complications due to your diabetes

## 2018-06-27 ENCOUNTER — OFFICE VISIT (OUTPATIENT)
Dept: PEDIATRICS | Facility: CLINIC | Age: 53
End: 2018-06-27
Payer: COMMERCIAL

## 2018-06-27 VITALS
BODY MASS INDEX: 24.94 KG/M2 | WEIGHT: 173.8 LBS | SYSTOLIC BLOOD PRESSURE: 132 MMHG | DIASTOLIC BLOOD PRESSURE: 78 MMHG | TEMPERATURE: 98.2 F | HEART RATE: 83 BPM | OXYGEN SATURATION: 99 %

## 2018-06-27 DIAGNOSIS — M96.1 FAILED BACK SURGICAL SYNDROME: ICD-10-CM

## 2018-06-27 DIAGNOSIS — R80.9 MICROALBUMINURIA DUE TO TYPE 2 DIABETES MELLITUS (H): ICD-10-CM

## 2018-06-27 DIAGNOSIS — N52.8 OTHER MALE ERECTILE DYSFUNCTION: ICD-10-CM

## 2018-06-27 DIAGNOSIS — E11.21 TYPE 2 DIABETES MELLITUS WITH DIABETIC NEPHROPATHY, WITHOUT LONG-TERM CURRENT USE OF INSULIN (H): Primary | ICD-10-CM

## 2018-06-27 DIAGNOSIS — E11.29 MICROALBUMINURIA DUE TO TYPE 2 DIABETES MELLITUS (H): ICD-10-CM

## 2018-06-27 LAB
ALBUMIN SERPL-MCNC: 4.1 G/DL (ref 3.4–5)
ALP SERPL-CCNC: 117 U/L (ref 40–150)
ALT SERPL W P-5'-P-CCNC: 33 U/L (ref 0–70)
ANION GAP SERPL CALCULATED.3IONS-SCNC: 11 MMOL/L (ref 3–14)
AST SERPL W P-5'-P-CCNC: 26 U/L (ref 0–45)
BILIRUB SERPL-MCNC: 0.9 MG/DL (ref 0.2–1.3)
BUN SERPL-MCNC: 11 MG/DL (ref 7–30)
CALCIUM SERPL-MCNC: 9.3 MG/DL (ref 8.5–10.1)
CHLORIDE SERPL-SCNC: 102 MMOL/L (ref 94–109)
CO2 SERPL-SCNC: 24 MMOL/L (ref 20–32)
CREAT SERPL-MCNC: 0.73 MG/DL (ref 0.66–1.25)
GFR SERPL CREATININE-BSD FRML MDRD: >90 ML/MIN/1.7M2
GLUCOSE SERPL-MCNC: 354 MG/DL (ref 70–99)
HBA1C MFR BLD: 9.2 % (ref 0–5.6)
HIV 1+2 AB+HIV1 P24 AG SERPL QL IA: NONREACTIVE
POTASSIUM SERPL-SCNC: 4 MMOL/L (ref 3.4–5.3)
PROT SERPL-MCNC: 8.3 G/DL (ref 6.8–8.8)
SODIUM SERPL-SCNC: 137 MMOL/L (ref 133–144)

## 2018-06-27 PROCEDURE — 80053 COMPREHEN METABOLIC PANEL: CPT | Performed by: INTERNAL MEDICINE

## 2018-06-27 PROCEDURE — 99214 OFFICE O/P EST MOD 30 MIN: CPT | Performed by: INTERNAL MEDICINE

## 2018-06-27 PROCEDURE — 36415 COLL VENOUS BLD VENIPUNCTURE: CPT | Performed by: INTERNAL MEDICINE

## 2018-06-27 PROCEDURE — 87389 HIV-1 AG W/HIV-1&-2 AB AG IA: CPT | Performed by: INTERNAL MEDICINE

## 2018-06-27 PROCEDURE — 99207 C FOOT EXAM  NO CHARGE: CPT | Performed by: INTERNAL MEDICINE

## 2018-06-27 PROCEDURE — 82043 UR ALBUMIN QUANTITATIVE: CPT | Performed by: INTERNAL MEDICINE

## 2018-06-27 PROCEDURE — 83036 HEMOGLOBIN GLYCOSYLATED A1C: CPT | Performed by: INTERNAL MEDICINE

## 2018-06-27 RX ORDER — IBUPROFEN 800 MG/1
800 TABLET, FILM COATED ORAL EVERY 8 HOURS PRN
Qty: 90 TABLET | Status: CANCELLED | OUTPATIENT
Start: 2018-06-27

## 2018-06-27 RX ORDER — TRAMADOL HYDROCHLORIDE 50 MG/1
50 TABLET ORAL EVERY 6 HOURS PRN
Qty: 30 TABLET | Refills: 1 | Status: SHIPPED | OUTPATIENT
Start: 2018-06-27 | End: 2019-05-29

## 2018-06-27 RX ORDER — LISINOPRIL 20 MG/1
20 TABLET ORAL DAILY
Qty: 30 TABLET | Refills: 1 | Status: SHIPPED | OUTPATIENT
Start: 2018-06-27 | End: 2018-09-12

## 2018-06-27 RX ORDER — TADALAFIL 20 MG/1
20 TABLET ORAL DAILY PRN
Qty: 10 TABLET | Refills: 1 | Status: CANCELLED | OUTPATIENT
Start: 2018-06-27

## 2018-06-27 RX ORDER — GABAPENTIN 300 MG/1
300 CAPSULE ORAL 3 TIMES DAILY
Qty: 90 CAPSULE | Refills: 3 | Status: SHIPPED | OUTPATIENT
Start: 2018-06-27 | End: 2019-05-29

## 2018-06-27 RX ORDER — LISINOPRIL 10 MG/1
10 TABLET ORAL DAILY
Qty: 30 TABLET | Refills: 2 | Status: CANCELLED | OUTPATIENT
Start: 2018-06-27

## 2018-06-27 RX ORDER — SILDENAFIL CITRATE 20 MG/1
TABLET ORAL
Qty: 10 TABLET | Refills: 3 | Status: SHIPPED | OUTPATIENT
Start: 2018-06-27 | End: 2019-05-29

## 2018-06-27 RX ORDER — METFORMIN HCL 500 MG
1000 TABLET, EXTENDED RELEASE 24 HR ORAL 2 TIMES DAILY WITH MEALS
Qty: 360 TABLET | Refills: 3 | Status: SHIPPED | OUTPATIENT
Start: 2018-06-27 | End: 2019-06-26

## 2018-06-27 NOTE — PATIENT INSTRUCTIONS
Start invokana 100 mg every morning with breakfast.     OK to use sildenafil instead of cialis as directed.    Increase your lisinopril from 10 mg to 20 mg.    I've done a referral for Dr. Torres at [facility].     You can call for an appointment with urology.

## 2018-06-27 NOTE — PROGRESS NOTES
SUBJECTIVE:   Latrell Knowles is a 53 year old male who presents to clinic today for the following health issues:      Diabetes Follow-up    Patient is checking blood sugars: once daily.  Results are as follows:         am - 100-150              postprandial after lunch- 100-150    Diabetic concerns: None     Symptoms of hypoglycemia (low blood sugar): none     Paresthesias (numbness or burning in feet) or sores: Yes numbness and tingling     Date of last diabetic eye exam: 6/26/18    Diabetes Management Resources    Hyperlipidemia Follow-Up      Rate your low fat/cholesterol diet?: good    Taking statin?  No    Other lipid medications/supplements?:  none    Hypertension Follow-up      Outpatient blood pressures are not being checked.    Low Salt Diet: no added salt    BP Readings from Last 2 Encounters:   06/27/18 132/78   02/13/18 117/76     Hemoglobin A1C (%)   Date Value   06/27/2018 9.2 (H)   01/03/2018 8.6 (H)     LDL Cholesterol Calculated (mg/dL)   Date Value   01/03/2018 76   05/09/2016 89       Amount of exercise or physical activity: 3 days/week for an average of 45-60 minutes    Problems taking medications regularly: No    Medication side effects: none    Diet: muffin with egg and coffee, burito , meat and salad, fruit      Would like referal to spine specialist. Dr. Maddox is not in his network anymore.    Needs referal to urologist,PSA    Problem list and histories reviewed & adjusted, as indicated.  Additional history: as documented    Patient Active Problem List   Diagnosis     Type 2 diabetes mellitus with diabetic nephropathy, without long-term current use of insulin (H)     HYPERLIPIDEMIA LDL GOAL <100     CKD (chronic kidney disease) stage 1, GFR 90 ml/min or greater     ED (erectile dysfunction)     Past Surgical History:   Procedure Laterality Date     C LAMINECTOMY,>2 SGMT,LUMBAR  1997       Social History   Substance Use Topics     Smoking status: Never Smoker     Smokeless  tobacco: Never Used     Alcohol use Yes      Comment: rarely     Family History   Problem Relation Age of Onset     Type 2 Diabetes Brother      Type 2 Diabetes Mother      age 50's     Type 2 Diabetes Sister      Cancer - colorectal No family hx of      Glaucoma No family hx of      Macular Degeneration No family hx of            Reviewed and updated as needed this visit by clinical staff  Tobacco  Allergies  Meds  Med Hx  Surg Hx  Fam Hx  Soc Hx      Reviewed and updated as needed this visit by Provider         ROS:  Constitutional, HEENT, cardiovascular, pulmonary, GI, , musculoskeletal, neuro, skin, endocrine and psych systems are negative, except as otherwise noted.    OBJECTIVE:     /78 (Cuff Size: Adult Regular)  Pulse 83  Temp 98.2  F (36.8  C) (Oral)  Wt 173 lb 12.8 oz (78.8 kg)  SpO2 99%  BMI 24.94 kg/m2  Body mass index is 24.94 kg/(m^2).  GENERAL: healthy, alert and no distress  NECK: no adenopathy, no asymmetry, masses, or scars and thyroid normal to palpation  RESP: lungs clear to auscultation - no rales, rhonchi or wheezes  CV: regular rate and rhythm, normal S1 S2, no S3 or S4, no murmur, click or rub, no peripheral edema and peripheral pulses strong  ABDOMEN: soft, nontender, no hepatosplenomegaly, no masses and bowel sounds normal  MS: no gross musculoskeletal defects noted, no edema  Diabetic foot exam: normal DP and PT pulses, no trophic changes or ulcerative lesions and reduced sensation at toes bilaterally    Diagnostic Test Results:  Results for orders placed or performed in visit on 06/27/18   Hemoglobin A1c   Result Value Ref Range    Hemoglobin A1C 9.2 (H) 0 - 5.6 %   Comprehensive metabolic panel (BMP + Alb, Alk Phos, ALT, AST, Total. Bili, TP)   Result Value Ref Range    Sodium 137 133 - 144 mmol/L    Potassium 4.0 3.4 - 5.3 mmol/L    Chloride 102 94 - 109 mmol/L    Carbon Dioxide 24 20 - 32 mmol/L    Anion Gap 11 3 - 14 mmol/L    Glucose 354 (H) 70 - 99 mg/dL    Urea  Nitrogen 11 7 - 30 mg/dL    Creatinine 0.73 0.66 - 1.25 mg/dL    GFR Estimate >90 >60 mL/min/1.7m2    GFR Estimate If Black >90 >60 mL/min/1.7m2    Calcium 9.3 8.5 - 10.1 mg/dL    Bilirubin Total 0.9 0.2 - 1.3 mg/dL    Albumin 4.1 3.4 - 5.0 g/dL    Protein Total 8.3 6.8 - 8.8 g/dL    Alkaline Phosphatase 117 40 - 150 U/L    ALT 33 0 - 70 U/L    AST 26 0 - 45 U/L   HIV Antigen Antibody Combo   Result Value Ref Range    HIV Antigen Antibody Combo Nonreactive NR^Nonreactive       Albumin Random Urine Quantitative with Creat Ratio   Result Value Ref Range    Creatinine Urine 64 mg/dL    Albumin Urine mg/L 17 mg/L    Albumin Urine mg/g Cr 26.98 (H) 0 - 17 mg/g Cr       ASSESSMENT/PLAN:     1. Type 2 diabetes mellitus with diabetic nephropathy, without long-term current use of insulin (H)  Inadequate control. Plan add invokana. Follow up in 1 month. Not taking statin. Does not want to start.  - Hemoglobin A1c  - Comprehensive metabolic panel (BMP + Alb, Alk Phos, ALT, AST, Total. Bili, TP)  - HIV Antigen Antibody Combo  - Albumin Random Urine Quantitative with Creat Ratio  - metFORMIN (GLUCOPHAGE-XR) 500 MG 24 hr tablet; Take 2 tablets (1,000 mg) by mouth 2 times daily (with meals)  Dispense: 360 tablet; Refill: 3  - FOOT EXAM  - canagliflozin (INVOKANA) 100 MG tablet; Take 1 tablet (100 mg) by mouth every morning (before breakfast)  Dispense: 30 tablet; Refill: 3    2. Microalbuminuria due to type 2 diabetes mellitus (H)  Increase lisinopril  - lisinopril (PRINIVIL/ZESTRIL) 20 MG tablet; Take 1 tablet (20 mg) by mouth daily  Dispense: 30 tablet; Refill: 1    3. Other male erectile dysfunction  cialis too expensive  - sildenafil (REVATIO) 20 MG tablet; Take 1-2 tablets 30 minutes to 4 hours before intercourse.  Dispense: 10 tablet; Refill: 3  - UROLOGY ADULT REFERRAL    4. Failed back surgical syndrome  Referral done.   - gabapentin (NEURONTIN) 300 MG capsule; Take 1 capsule (300 mg) by mouth 3 times daily  Dispense: 90  capsule; Refill: 3  - traMADol (ULTRAM) 50 MG tablet; Take 1 tablet (50 mg) by mouth every 6 hours as needed Every 4-6 hours as needed  Dispense: 30 tablet; Refill: 1  - ORTHO  REFERRAL    Patient Instructions   Start invokana 100 mg every morning with breakfast.     OK to use sildenafil instead of cialis as directed.    Increase your lisinopril from 10 mg to 20 mg.    I've done a referral for Dr. Torres at [facility].     You can call for an appointment with urology.      Jo Rosado MD  [facility]

## 2018-06-27 NOTE — MR AVS SNAPSHOT
After Visit Summary   6/27/2018    Latrell Knowles    MRN: [id number]           Patient Information     Date Of Birth          1965        Visit Information        Provider Department      6/27/2018 11:10 AM Jo Rosado MD [facility]        Today's Diagnoses     Type 2 diabetes mellitus with diabetic nephropathy, without long-term current use of insulin (H)    -  1    Microalbuminuria due to type 2 diabetes mellitus (H)        Other male erectile dysfunction        Failed back surgical syndrome          Care Instructions    Start invokana 100 mg every morning with breakfast.     OK to use sildenafil instead of cialis as directed.    Increase your lisinopril from 10 mg to 20 mg.    I've done a referral for Dr. Torres at [facility].     You can call for an appointment with urology.          Follow-ups after your visit        Additional Services     ORTHO  REFERRAL       [facility] is referring you to the Orthopedic  Services at [facility].       The  Representative will assist you in the coordination of your Orthopedic and Musculoskeletal Care as prescribed by your physician.    The  Representative will call you within 1 business day to help schedule your appointment, or you may contact the  Representative at:    All areas ~ [phone]     Type of Referral : Spine: Lumbar  **Choose Medical Spine Specialist (unless patient was seen by a Medical Spine Specialist within the past 6 months).**  Surgical Evaluation is advised if the patient presents with one or more of the following red flags: Evidence of Spinal Tumor, Infection or Fracture, Cauda Equina Syndrome, Sudden or Progressive Weakness, Loss of Bowel or Bladder Control, or any other documented emergent neurological condition resulting from a Lumbar Spinal Condition. Spine Surgeon        Timeframe requested: Routine Dr. Torres,  [facility].    Coverage of these services is subject to the terms and limitations of your health insurance plan.  Please call member services at your health plan with any benefit or coverage questions.      If X-rays, CT or MRI's have been performed, please contact the facility where they were done to arrange for , prior to your scheduled appointment.  Please bring this referral request to your appointment and present it to your specialist.            UROLOGY ADULT REFERRAL       Your provider has referred you to: [facility]: [facility] [place] [phone]   [email or web address]  Or [facility] [place], [phone]      Please be aware that coverage of these services is subject to the terms and limitations of your health insurance plan.  Call member services at your health plan with any benefit or coverage questions.      Please bring the following with you to your appointment:    (1) Any X-Rays, CTs or MRIs which have been performed.  Contact the facility where they were done to arrange for  prior to your scheduled appointment.    (2) List of current medications  (3) This referral request   (4) Any documents/labs given to you for this referral                  Follow-up notes from your care team     Return in about 4 weeks (around 7/25/2018) for Follow Up Visit.      Your next 10 appointments already scheduled     Jul 25, 2018 11:10 AM CDT   SHORT with Jo Rosado MD   [facility]    [address] [place] [address] [address]  [facility] [address] [phone]              Who to contact     If you have questions or need follow up information about today's clinic visit or your schedule please contact [facility] directly at [phone].  Normal or non-critical lab and imaging results will be communicated to you by MyChart, letter or phone within 4 business days after the clinic has received  the results. If you do not hear from us within 7 days, please contact the clinic through [employer] or phone. If you have a critical or abnormal lab result, we will notify you by phone as soon as possible.  Submit refill requests through [employer] or call your pharmacy and they will forward the refill request to us. Please allow 3 business days for your refill to be completed.          Additional Information About Your Visit        [facility] Information     [employer] gives you secure access to your electronic health record. If you see a primary care provider, you can also send messages to your care team and make appointments. If you have questions, please call your primary care clinic.  If you do not have a primary care provider, please call [phone] and they will assist you.        Care EveryWhere ID     This is your Care EveryWhere ID. This could be used by other organizations to access your [place] medical records  [id number]        Your Vitals Were     Pulse Temperature Pulse Oximetry BMI (Body Mass Index)          83 98.2  F (36.8  C) (Oral) 99% 24.94 kg/m2         Blood Pressure from Last 3 Encounters:   06/27/18 132/78   02/13/18 117/76   01/03/18 (!) 145/96    Weight from Last 3 Encounters:   06/27/18 173 lb 12.8 oz (78.8 kg)   01/03/18 174 lb (78.9 kg)   01/03/18 174 lb 4.8 oz (79.1 kg)              We Performed the Following     Albumin Random Urine Quantitative with Creat Ratio     Comprehensive metabolic panel (BMP + Alb, Alk Phos, ALT, AST, Total. Bili, TP)     FOOT EXAM     Hemoglobin A1c     HIV Antigen Antibody Combo     ORTHO [facility] REFERRAL     UROLOGY ADULT REFERRAL          Today's Medication Changes          These changes are accurate as of 6/27/18 12:35 PM.  If you have any questions, ask your nurse or doctor.               Start taking these medicines.        Dose/Directions    canagliflozin 100 MG tablet   Commonly known as:  INVOKANA   Used for:  Type 2 diabetes mellitus with diabetic  nephropathy, without long-term current use of insulin (H)   Started by:  Jo Rosado MD        Dose:  100 mg   Take 1 tablet (100 mg) by mouth every morning (before breakfast)   Quantity:  30 tablet   Refills:  3       sildenafil 20 MG tablet   Commonly known as:  REVATIO   Used for:  Other male erectile dysfunction   Started by:  Jo Rosado MD        Take 1-2 tablets 30 minutes to 4 hours before intercourse.   Quantity:  10 tablet   Refills:  3         These medicines have changed or have updated prescriptions.        Dose/Directions    gabapentin 300 MG capsule   Commonly known as:  NEURONTIN   This may have changed:  additional instructions   Used for:  Failed back surgical syndrome   Changed by:  Jo Rosado MD        Dose:  300 mg   Take 1 capsule (300 mg) by mouth 3 times daily   Quantity:  90 capsule   Refills:  3       lisinopril 20 MG tablet   Commonly known as:  PRINIVIL/ZESTRIL   This may have changed:    - medication strength  - See the new instructions.   Used for:  Microalbuminuria due to type 2 diabetes mellitus (H)   Changed by:  Jo Rosado MD        Dose:  20 mg   Take 1 tablet (20 mg) by mouth daily   Quantity:  30 tablet   Refills:  1       traMADol 50 MG tablet   Commonly known as:  ULTRAM   This may have changed:  how much to take   Used for:  Failed back surgical syndrome   Changed by:  Jo Rosado MD        Dose:  50 mg   Take 1 tablet (50 mg) by mouth every 6 hours as needed Every 4-6 hours as needed   Quantity:  30 tablet   Refills:  1         Stop taking these medicines if you haven't already. Please contact your care team if you have questions.     ibuprofen 800 MG tablet   Commonly known as:  ADVIL/MOTRIN   Stopped by:  Jo Rosado MD           tadalafil 20 MG tablet   Commonly known as:  CIALIS   Stopped by:  Jo Rosado MD                Where to get your medicines      These medications were sent to [id number] - [place],  [place] - [address]  [address] [place] [address]     Phone:  [phone]     canagliflozin 100 MG tablet    gabapentin 300 MG capsule    lisinopril 20 MG tablet    metFORMIN 500 MG 24 hr tablet    sildenafil 20 MG tablet         Some of these will need a paper prescription and others can be bought over the counter.  Ask your nurse if you have questions.     Bring a paper prescription for each of these medications     traMADol 50 MG tablet               Information about OPIOIDS     PRESCRIPTION OPIOIDS: WHAT YOU NEED TO KNOW   We gave you an opioid (narcotic) pain medicine. It is important to manage your pain, but opioids are not always the best choice. You should first try all the other options your care team gave you. Take this medicine for as short a time (and as few doses) as possible.     These medicines have risks:    DO NOT drive when on new or higher doses of pain medicine. These medicines can affect your alertness and reaction times, and you could be arrested for driving under the influence (DUI). If you need to use opioids long-term, talk to your care team about driving.    DO NOT operate heave machinery    DO NOT do any other dangerous activities while taking these medicines.     DO NOT drink any alcohol while taking these medicines.      If the opioid prescribed includes acetaminophen, DO NOT take with any other medicines that contain acetaminophen. Read all labels carefully. Look for the word  acetaminophen  or  Tylenol.  Ask your pharmacist if you have questions or are unsure.    You can get addicted to pain medicines, especially if you have a history of addiction (chemical, alcohol or substance dependence). Talk to your care team about ways to reduce this risk.    Store your pills in a secure place, locked if possible. We will not replace any lost or stolen medicine. If you don t finish your medicine, please throw away (dispose) as directed by your pharmacist. The [facility]  Control Agency has more information about safe disposal: https://www.pca.[facility].[place].us/living-green/managing-unwanted-medications.     All opioids tend to cause constipation. Drink plenty of water and eat foods that have a lot of fiber, such as fruits, vegetables, prune juice, apple juice and high-fiber cereal. Take a laxative (Miralax, milk of magnesia, Colace, Senna) if you don t move your bowels at least every other day.          Primary Care Provider Office Phone # Fax #    Jo Rosado [phone] [address] [place] [address]        Equal Access to Services     [facility]: Hadii aad ku hadasho Sodenisse, waaxda luqadaha, qaybta kaalmada adeomeryafabian, griselda amaro . So [facility] [phone].    ATENCIÓN: Si habla español, tiene a ramirez disposición servicios gratuitos de asistencia lingüística. Llame al [phone].    We comply with applicable federal civil rights laws and [place] laws. We do not discriminate on the basis of race, color, national origin, age, disability, sex, sexual orientation, or gender identity.            Thank you!     Thank you for choosing [facility]  for your care. Our goal is always to provide you with excellent care. Hearing back from our patients is one way we can continue to improve our services. Please take a few minutes to complete the written survey that you may receive in the mail after your visit with us. Thank you!             Your Updated Medication List - Protect others around you: Learn how to safely use, store and throw away your medicines at www.disposemymeds.org.          This list is accurate as of 6/27/18 12:35 PM.  Always use your most recent med list.                   Brand Name Dispense Instructions for use Diagnosis    aspirin 81 MG tablet      Take 324 mg by mouth once        blood glucose monitoring lancets     3 Box    1 each 4 times daily Use to test blood sugar 4 times daily or as  directed.    Type 2 diabetes, HbA1c goal < 7% (H)       blood glucose monitoring test strip    ACCU-CHEK SMARTVIEW    3 Box    1 strip by In Vitro route 4 times daily (before meals and nightly) Use to test blood sugar 4 times daily or as directed.    Type 2 diabetes, HbA1c goal < 7% (H)       canagliflozin 100 MG tablet    INVOKANA    30 tablet    Take 1 tablet (100 mg) by mouth every morning (before breakfast)    Type 2 diabetes mellitus with diabetic nephropathy, without long-term current use of insulin (H)       cyclobenzaprine 10 MG tablet    FLEXERIL     Take 10 mg by mouth 3 times daily as needed 1 tab bid    ED (erectile dysfunction)       gabapentin 300 MG capsule    NEURONTIN    90 capsule    Take 1 capsule (300 mg) by mouth 3 times daily    Failed back surgical syndrome       lisinopril 20 MG tablet    PRINIVIL/ZESTRIL    30 tablet    Take 1 tablet (20 mg) by mouth daily    Microalbuminuria due to type 2 diabetes mellitus (H)       metFORMIN 500 MG 24 hr tablet    GLUCOPHAGE-XR    360 tablet    Take 2 tablets (1,000 mg) by mouth 2 times daily (with meals)    Type 2 diabetes mellitus with diabetic nephropathy, without long-term current use of insulin (H)       sildenafil 20 MG tablet    REVATIO    10 tablet    Take 1-2 tablets 30 minutes to 4 hours before intercourse.    Other male erectile dysfunction       traMADol 50 MG tablet    ULTRAM    30 tablet    Take 1 tablet (50 mg) by mouth every 6 hours as needed Every 4-6 hours as needed    Failed back surgical syndrome

## 2018-06-28 LAB
CREAT UR-MCNC: 64 MG/DL
MICROALBUMIN UR-MCNC: 17 MG/L
MICROALBUMIN/CREAT UR: 26.98 MG/G CR (ref 0–17)

## 2018-08-09 ENCOUNTER — TRANSFERRED RECORDS (OUTPATIENT)
Dept: HEALTH INFORMATION MANAGEMENT | Facility: CLINIC | Age: 53
End: 2018-08-09

## 2018-09-12 DIAGNOSIS — E11.29 MICROALBUMINURIA DUE TO TYPE 2 DIABETES MELLITUS (H): ICD-10-CM

## 2018-09-12 DIAGNOSIS — R80.9 MICROALBUMINURIA DUE TO TYPE 2 DIABETES MELLITUS (H): ICD-10-CM

## 2018-09-13 RX ORDER — LISINOPRIL 20 MG/1
20 TABLET ORAL DAILY
Qty: 30 TABLET | Refills: 0 | Status: SHIPPED | OUTPATIENT
Start: 2018-09-13 | End: 2018-12-19

## 2018-09-13 NOTE — TELEPHONE ENCOUNTER
Please assist patient with scheduling an office visit for BP recheck.  - patient recently No Showed    Thank you    Hillary ZAVALETA RN, BSN, PHN  [facility]

## 2018-09-13 NOTE — TELEPHONE ENCOUNTER
"Requested Prescriptions   Pending Prescriptions Disp Refills     lisinopril (PRINIVIL/ZESTRIL) 20 MG tablet  Last Written Prescription Date:  06/27/2018  Last Fill Quantity: 30,  # refills: 1   Last office visit: 6/27/2018 with prescribing provider:  MAYRA   Future Office Visit:     30 tablet 1     Sig: Take 1 tablet (20 mg) by mouth daily    ACE Inhibitors (Including Combos) Protocol Passed    9/12/2018  2:00 PM       Passed - Blood pressure under 140/90 in past 12 months    BP Readings from Last 3 Encounters:   06/27/18 132/78   02/13/18 117/76   01/03/18 (!) 145/96                Passed - Recent (12 mo) or future (30 days) visit within the authorizing provider's specialty    Patient had office visit in the last 12 months or has a visit in the next 30 days with authorizing provider or within the authorizing provider's specialty.  See \"Patient Info\" tab in inbasket, or \"Choose Columns\" in Meds & Orders section of the refill encounter.           Passed - Patient is age 18 or older       Passed - Normal serum creatinine on file in past 12 months    Recent Labs   Lab Test  06/27/18   1053   CR  0.73            Passed - Normal serum potassium on file in past 12 months    Recent Labs   Lab Test  06/27/18   1053   POTASSIUM  4.0               "

## 2018-09-13 NOTE — TELEPHONE ENCOUNTER
Medication is being filled for 1 time refill only due to:  Patient needs to be seen because due for BP/medication follow up d/t increase in BP med..     Prescription approved per [facility] Refill Protocol.    Hillary ZAVALETA RN, BSN, PHN  Marion Flex RN

## 2018-09-13 NOTE — TELEPHONE ENCOUNTER
Called and spoke with patient. Verbalized understanding and didn't wanna set anything up at this time but said he will call and schedule at some point.     Margarita Nunez MA 3:57 PM 9/13/2018

## 2018-12-18 DIAGNOSIS — E11.29 MICROALBUMINURIA DUE TO TYPE 2 DIABETES MELLITUS (H): ICD-10-CM

## 2018-12-18 DIAGNOSIS — R80.9 MICROALBUMINURIA DUE TO TYPE 2 DIABETES MELLITUS (H): ICD-10-CM

## 2018-12-18 NOTE — TELEPHONE ENCOUNTER
"Requested Prescriptions   Pending Prescriptions Disp Refills     lisinopril (PRINIVIL/ZESTRIL) 20 MG tablet    Last Written Prescription Date:  9/13/2018  Last Fill Quantity: 30,  # refills: 0   Last office visit: 6/27/2018 with prescribing provider:  Jo Rosado     Future Office Visit:     30 tablet 0     Sig: Take 1 tablet (20 mg) by mouth daily    ACE Inhibitors (Including Combos) Protocol Passed - 12/18/2018  2:53 PM       Passed - Blood pressure under 140/90 in past 12 months    BP Readings from Last 3 Encounters:   06/27/18 132/78   02/13/18 117/76   01/03/18 (!) 145/96                Passed - Recent (12 mo) or future (30 days) visit within the authorizing provider's specialty    Patient had office visit in the last 12 months or has a visit in the next 30 days with authorizing provider or within the authorizing provider's specialty.  See \"Patient Info\" tab in inbasket, or \"Choose Columns\" in Meds & Orders section of the refill encounter.             Passed - Patient is age 18 or older       Passed - Normal serum creatinine on file in past 12 months    Recent Labs   Lab Test 06/27/18  1053   CR 0.73            Passed - Normal serum potassium on file in past 12 months    Recent Labs   Lab Test 06/27/18  1053   POTASSIUM 4.0                 "

## 2018-12-19 RX ORDER — LISINOPRIL 20 MG/1
20 TABLET ORAL DAILY
Qty: 30 TABLET | Refills: 0 | Status: SHIPPED | OUTPATIENT
Start: 2018-12-19 | End: 2019-05-29

## 2018-12-19 NOTE — TELEPHONE ENCOUNTER
Routing refill request to provider for review/approval because:  Mojgan given x1 and patient did not follow up, please advise  Sara Singleton, RN  Message handled by Nurse Triage.

## 2019-01-26 ENCOUNTER — APPOINTMENT (OUTPATIENT)
Dept: GENERAL RADIOLOGY | Facility: CLINIC | Age: 54
End: 2019-01-26
Payer: COMMERCIAL

## 2019-01-26 ENCOUNTER — HOSPITAL ENCOUNTER (EMERGENCY)
Facility: CLINIC | Age: 54
Discharge: HOME | End: 2019-01-26
Attending: PHYSICIAN ASSISTANT | Admitting: PHYSICIAN ASSISTANT
Payer: COMMERCIAL

## 2019-01-26 VITALS
TEMPERATURE: 98.4 F | DIASTOLIC BLOOD PRESSURE: 92 MMHG | SYSTOLIC BLOOD PRESSURE: 149 MMHG | OXYGEN SATURATION: 97 % | RESPIRATION RATE: 16 BRPM | HEART RATE: 93 BPM

## 2019-01-26 DIAGNOSIS — M54.42 ACUTE LEFT-SIDED LOW BACK PAIN WITH LEFT-SIDED SCIATICA: ICD-10-CM
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
## 2019-01-26 DIAGNOSIS — S39.012A BACK STRAIN, INITIAL ENCOUNTER: ICD-10-CM

## 2019-01-26 PROCEDURE — 72100 X-RAY EXAM L-S SPINE 2/3 VWS: CPT

## 2019-01-26 PROCEDURE — 25000132 ZZH RX MED GY IP 250 OP 250 PS 637: Performed by: PHYSICIAN ASSISTANT

## 2019-01-26 PROCEDURE — 99283 EMERGENCY DEPT VISIT LOW MDM: CPT

## 2019-01-26 RX ORDER — METHOCARBAMOL 500 MG/1
500 TABLET, FILM COATED ORAL 4 TIMES DAILY PRN
Qty: 30 TABLET | Refills: 0 | Status: SHIPPED | OUTPATIENT
Start: 2019-01-26 | End: 2019-06-26

## 2019-01-26 RX ORDER — LIDOCAINE 4 G/G
1 PATCH TOPICAL ONCE
Status: DISCONTINUED | OUTPATIENT
Start: 2019-01-26 | End: 2019-01-26 | Stop reason: HOSPADM

## 2019-01-26 RX ADMIN — LIDOCAINE 1 PATCH: 560 PATCH PERCUTANEOUS; TOPICAL; TRANSDERMAL at 14:37

## 2019-01-26 ASSESSMENT — ENCOUNTER SYMPTOMS
WEAKNESS: 0
ABDOMINAL PAIN: 0
HEADACHES: 0
NUMBNESS: 0
BACK PAIN: 1
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
## 2019-01-26 NOTE — ED AVS SNAPSHOT
[facility] Emergency Department  [address]  [facility] [address]  Phone:  [phone]  Fax:  [phone]                                    Latrell Knowles   MRN: [id number]    Department:  [facility] Emergency Department   Date of Visit:  1/26/2019           After Visit Summary Signature Page    I have received my discharge instructions, and my questions have been answered. I have discussed any challenges I see with this plan with the nurse or doctor.    ..........................................................................................................................................  Patient/Patient Representative Signature      ..........................................................................................................................................  Patient Representative Print Name and Relationship to Patient    ..................................................               ................................................  Date                                   Time    ..........................................................................................................................................  Reviewed by Signature/Title    ...................................................              ..............................................  Date                                               Time          22EPIC Rev 08/18
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
## 2019-05-29 ENCOUNTER — OFFICE VISIT (OUTPATIENT)
Dept: PEDIATRICS | Facility: CLINIC | Age: 54
End: 2019-05-29
Payer: MEDICARE

## 2019-05-29 VITALS
HEART RATE: 70 BPM | OXYGEN SATURATION: 98 % | TEMPERATURE: 97.8 F | HEIGHT: 70 IN | SYSTOLIC BLOOD PRESSURE: 115 MMHG | BODY MASS INDEX: 23.31 KG/M2 | DIASTOLIC BLOOD PRESSURE: 80 MMHG | WEIGHT: 162.8 LBS

## 2019-05-29 DIAGNOSIS — N52.8 OTHER MALE ERECTILE DYSFUNCTION: ICD-10-CM

## 2019-05-29 DIAGNOSIS — E11.29 MICROALBUMINURIA DUE TO TYPE 2 DIABETES MELLITUS (H): ICD-10-CM

## 2019-05-29 DIAGNOSIS — L72.3 SEBACEOUS CYST: Primary | ICD-10-CM

## 2019-05-29 DIAGNOSIS — M96.1 FAILED BACK SURGICAL SYNDROME: ICD-10-CM

## 2019-05-29 DIAGNOSIS — E11.65 UNCONTROLLED TYPE 2 DIABETES MELLITUS WITH HYPERGLYCEMIA (H): ICD-10-CM

## 2019-05-29 DIAGNOSIS — R80.9 MICROALBUMINURIA DUE TO TYPE 2 DIABETES MELLITUS (H): ICD-10-CM

## 2019-05-29 DIAGNOSIS — E11.21 TYPE 2 DIABETES MELLITUS WITH DIABETIC NEPHROPATHY, WITHOUT LONG-TERM CURRENT USE OF INSULIN (H): ICD-10-CM

## 2019-05-29 LAB
ALBUMIN SERPL-MCNC: 4 G/DL (ref 3.4–5)
ALP SERPL-CCNC: 125 U/L (ref 40–150)
ALT SERPL W P-5'-P-CCNC: 27 U/L (ref 0–70)
ANION GAP SERPL CALCULATED.3IONS-SCNC: 7 MMOL/L (ref 3–14)
AST SERPL W P-5'-P-CCNC: 20 U/L (ref 0–45)
BILIRUB SERPL-MCNC: 1 MG/DL (ref 0.2–1.3)
BUN SERPL-MCNC: 13 MG/DL (ref 7–30)
CALCIUM SERPL-MCNC: 8.6 MG/DL (ref 8.5–10.1)
CHLORIDE SERPL-SCNC: 100 MMOL/L (ref 94–109)
CHOLEST SERPL-MCNC: 184 MG/DL
CO2 SERPL-SCNC: 27 MMOL/L (ref 20–32)
CREAT SERPL-MCNC: 0.62 MG/DL (ref 0.66–1.25)
GFR SERPL CREATININE-BSD FRML MDRD: >90 ML/MIN/{1.73_M2}
GLUCOSE SERPL-MCNC: 313 MG/DL (ref 70–99)
HBA1C MFR BLD: 11.8 % (ref 0–5.6)
HDLC SERPL-MCNC: 76 MG/DL
LDLC SERPL CALC-MCNC: 94 MG/DL
NONHDLC SERPL-MCNC: 108 MG/DL
POTASSIUM SERPL-SCNC: 4.4 MMOL/L (ref 3.4–5.3)
PROT SERPL-MCNC: 8 G/DL (ref 6.8–8.8)
SODIUM SERPL-SCNC: 134 MMOL/L (ref 133–144)
TRIGL SERPL-MCNC: 71 MG/DL
TSH SERPL DL<=0.005 MIU/L-ACNC: 0.63 MU/L (ref 0.4–4)

## 2019-05-29 PROCEDURE — 84443 ASSAY THYROID STIM HORMONE: CPT | Performed by: INTERNAL MEDICINE

## 2019-05-29 PROCEDURE — 99214 OFFICE O/P EST MOD 30 MIN: CPT | Performed by: INTERNAL MEDICINE

## 2019-05-29 PROCEDURE — 80061 LIPID PANEL: CPT | Performed by: INTERNAL MEDICINE

## 2019-05-29 PROCEDURE — 80053 COMPREHEN METABOLIC PANEL: CPT | Performed by: INTERNAL MEDICINE

## 2019-05-29 PROCEDURE — 36415 COLL VENOUS BLD VENIPUNCTURE: CPT | Performed by: INTERNAL MEDICINE

## 2019-05-29 PROCEDURE — 83036 HEMOGLOBIN GLYCOSYLATED A1C: CPT | Performed by: INTERNAL MEDICINE

## 2019-05-29 RX ORDER — LISINOPRIL 20 MG/1
20 TABLET ORAL DAILY
Qty: 30 TABLET | Refills: 3 | Status: SHIPPED | OUTPATIENT
Start: 2019-05-29 | End: 2019-06-26

## 2019-05-29 RX ORDER — SILDENAFIL CITRATE 20 MG/1
TABLET ORAL
Qty: 20 TABLET | Refills: 3 | Status: SHIPPED | OUTPATIENT
Start: 2019-05-29 | End: 2021-08-27

## 2019-05-29 RX ORDER — TRAMADOL HYDROCHLORIDE 50 MG/1
50 TABLET ORAL EVERY 6 HOURS PRN
Qty: 30 TABLET | Refills: 0 | Status: SHIPPED | OUTPATIENT
Start: 2019-05-29

## 2019-05-29 RX ORDER — CEPHALEXIN 500 MG/1
500 CAPSULE ORAL 3 TIMES DAILY
Qty: 21 CAPSULE | Refills: 0 | Status: SHIPPED | OUTPATIENT
Start: 2019-05-29 | End: 2019-06-26

## 2019-05-29 RX ORDER — GABAPENTIN 300 MG/1
300 CAPSULE ORAL 2 TIMES DAILY
Qty: 60 CAPSULE | Refills: 3 | Status: SHIPPED | OUTPATIENT
Start: 2019-05-29 | End: 2019-06-26

## 2019-05-29 ASSESSMENT — MIFFLIN-ST. JEOR: SCORE: 1574.71

## 2019-05-29 NOTE — PROGRESS NOTES
Subjective     Latrell Knowles is a 54 year old male who presents to clinic today for the following health issues:    HPI   Mass on back      Duration: 2 weeks     Description (location/character/radiation): about size of quarter. No pain, no color.     Intensity:  Moderate, mass bothers pt.     Accompanying signs and symptoms: none    History (similar episodes/previous evaluation): has had mass before.     Precipitating or alleviating factors: None    Therapies tried and outcome: applied heat, and tried to open mass, not helping.       P     Diabetes Follow-up      How often are you checking your blood sugar? A few times a week    What time of day are you checking your blood sugars (select all that apply)?  Before meals    Have you had any blood sugars above 200?  No    Have you had any blood sugars below 70?  No    What symptoms do you notice when your blood sugar is low?  None    What concerns do you have today about your diabetes? None     Do you have any of these symptoms? (Select all that apply)   No numbness or tingling in feet.  No redness, sores or blisters on feet.  No complaints of excessive thirst.  No reports of blurry vision.  No significant changes to weight.     Pt does feel cramps on both top of feet.      Have you had a diabetic eye exam in the last 12 months? Yes- Date of last eye exam:  2017    BP Readings from Last 2 Encounters:   05/29/19 115/80   01/26/19 (!) 149/92     Hemoglobin A1C (%)   Date Value   05/29/2019 11.8 (H)   06/27/2018 9.2 (H)     LDL Cholesterol Calculated (mg/dL)   Date Value   05/29/2019 94   01/03/2018 76     Would like refills on his diabetes and blood pressure medications.    Also asking for cialis rx. In past was too expensive. Thinks the 20 mg sildenafil tabs didn't work for him. Didn't try taking 2.     Diabetes Management Resources    Patient Active Problem List   Diagnosis     Type 2 diabetes mellitus with diabetic nephropathy, without long-term current use of  "insulin (H)     HYPERLIPIDEMIA LDL GOAL <100     CKD (chronic kidney disease) stage 1, GFR 90 ml/min or greater     ED (erectile dysfunction)     Past Surgical History:   Procedure Laterality Date     C LAMINECTOMY,>2 SGMT,LUMBAR  1997       Social History     Tobacco Use     Smoking status: Never Smoker     Smokeless tobacco: Never Used   Substance Use Topics     Alcohol use: Yes     Comment: rarely     Family History   Problem Relation Age of Onset     Diabetes Type 2  Brother      Diabetes Type 2  Mother         age 50's     Diabetes Type 2  Sister      Cancer - colorectal No family hx of      Glaucoma No family hx of      Macular Degeneration No family hx of            Reviewed and updated as needed this visit by Provider  Tobacco  Allergies  Meds  Problems  Med Hx  Surg Hx  Fam Hx         Review of Systems   ROS COMP: Constitutional, HEENT, cardiovascular, pulmonary, gi and gu systems are negative, except as otherwise noted.      Objective    /80 (BP Location: Right arm, Patient Position: Sitting, Cuff Size: Adult Regular)   Pulse 70   Temp 97.8  F (36.6  C) (Oral)   Ht 1.77 m (5' 9.69\")   Wt 73.8 kg (162 lb 12.8 oz)   SpO2 98%   BMI 23.57 kg/m    Body mass index is 23.57 kg/m .  Physical Exam   GENERAL: healthy, alert and no distress  SKIN: no suspicious lesions or rashes and 1 cm mass R posterior back consistent with sebaceous cyst. No overlying redness. No tenderness to palpation.    Diagnostic Test Results:  Labs reviewed in Epic        Assessment & Plan     1. Sebaceous cyst  Does not appear infected, but bothers him when he leans back.   - GENERAL SURG ADULT REFERRAL  - cephALEXin (KEFLEX) 500 MG capsule; Take 1 capsule (500 mg) by mouth 3 times daily If redness occurs.  Dispense: 21 capsule; Refill: 0    2. Failed back surgical syndrome  Refilled.  - gabapentin (NEURONTIN) 300 MG capsule; Take 1 capsule (300 mg) by mouth 2 times daily  Dispense: 60 capsule; Refill: 3  - traMADol " (ULTRAM) 50 MG tablet; Take 1 tablet (50 mg) by mouth every 6 hours as needed for moderate to severe pain Every 4-6 hours as needed  Dispense: 30 tablet; Refill: 0    3. Microalbuminuria due to type 2 diabetes mellitus (H)  Didn't have insurance for a year or more. Now has medicare and is planning on coming in to follow up further on his medical conditions.  - lisinopril (PRINIVIL/ZESTRIL) 20 MG tablet; Take 1 tablet (20 mg) by mouth daily  Dispense: 30 tablet; Refill: 3    4. Other male erectile dysfunction  Discussed his cost concerns.  - sildenafil (REVATIO) 20 MG tablet; Take 1-2 tablets 30 minutes to 4 hours before intercourse.  Dispense: 20 tablet; Refill: 3    5.  Uncontrolled type 2 diabetes mellitus with hyperglycemia (H)  On metformin only for now. Await A1C and follow up with me in 1 month.  - lisinopril (PRINIVIL/ZESTRIL) 20 MG tablet; Take 1 tablet (20 mg) by mouth daily  Dispense: 30 tablet; Refill: 3  - Comprehensive metabolic panel (BMP + Alb, Alk Phos, ALT, AST, Total. Bili, TP)  - Lipid panel reflex to direct LDL Fasting  - Hemoglobin A1c  - TSH with free T4 reflex  - DIABETES EDUCATOR REFERRAL       See Patient Instructions    Return in about 1 month (around 6/26/2019) for Follow Up Visit.    Jo Rosado MD  [facility]

## 2019-05-29 NOTE — PATIENT INSTRUCTIONS
Call for appointment with general surgery in [place].  I don't think your cyst is infected, but if it gets red and tender, start the cephalexin antibiotic and take for 7 days.    Check with your pharmacist about the cost of sildenafil (viagra) vs cialis and if there are any reduced cost programs they have.    I've refilled your other medications.

## 2019-05-31 ENCOUNTER — TELEPHONE (OUTPATIENT)
Dept: SURGERY | Facility: CLINIC | Age: 54
End: 2019-05-31

## 2019-05-31 NOTE — TELEPHONE ENCOUNTER
Referral received from Dr. Rosado for cyst.    Attempt #1:    Called patient at [phone].  No answer - left message for patient to return call to clinic at [phone].

## 2019-06-06 DIAGNOSIS — M96.1 FAILED BACK SURGICAL SYNDROME: ICD-10-CM

## 2019-06-06 NOTE — TELEPHONE ENCOUNTER
Requested Prescriptions   Pending Prescriptions Disp Refills     traMADol (ULTRAM) 50 MG tablet [Pharmacy Med Name: traMADol HCl Oral Tablet 50 MG]        Last Written Prescription Date:  5/29/2019  Last Fill Quantity: 30,   # refills: 0  Last Office Visit: 5/29/2019  Future Office visit:       Routing refill request to provider for review/approval because:  Drug not on the [facility], P or [facility] refill protocol or controlled substance   30 tablet 0     Sig: Take 1 tablet (50 mg) by mouth every 6 hours as needed for moderate to severe pain Every 4-6 hours as needed       There is no refill protocol information for this order

## 2019-06-07 RX ORDER — TRAMADOL HYDROCHLORIDE 50 MG/1
50 TABLET ORAL EVERY 6 HOURS PRN
Qty: 30 TABLET | Refills: 0 | OUTPATIENT
Start: 2019-06-07

## 2019-06-07 NOTE — TELEPHONE ENCOUNTER
Just filled 30 tabs 6 days ago. Denied. Sent notification to pharmacy  Jo Rosado M.D.     Pt was told to call for lab order for Vit D. Please order. Pr requesting he be notified.

## 2019-06-26 ENCOUNTER — OFFICE VISIT (OUTPATIENT)
Dept: PEDIATRICS | Facility: CLINIC | Age: 54
End: 2019-06-26
Payer: MEDICARE

## 2019-06-26 ENCOUNTER — TELEPHONE (OUTPATIENT)
Dept: PEDIATRICS | Facility: CLINIC | Age: 54
End: 2019-06-26

## 2019-06-26 VITALS
BODY MASS INDEX: 23.18 KG/M2 | HEART RATE: 80 BPM | SYSTOLIC BLOOD PRESSURE: 110 MMHG | OXYGEN SATURATION: 99 % | HEIGHT: 70 IN | TEMPERATURE: 97.9 F | DIASTOLIC BLOOD PRESSURE: 60 MMHG | WEIGHT: 161.9 LBS

## 2019-06-26 DIAGNOSIS — Z63.5 MARITAL DISRUPTION INVOLVING DIVORCE: ICD-10-CM

## 2019-06-26 DIAGNOSIS — M96.1 FAILED BACK SURGICAL SYNDROME: ICD-10-CM

## 2019-06-26 DIAGNOSIS — R80.9 MICROALBUMINURIA DUE TO TYPE 2 DIABETES MELLITUS (H): ICD-10-CM

## 2019-06-26 DIAGNOSIS — E11.21 TYPE 2 DIABETES MELLITUS WITH DIABETIC NEPHROPATHY, WITHOUT LONG-TERM CURRENT USE OF INSULIN (H): Primary | ICD-10-CM

## 2019-06-26 DIAGNOSIS — E11.65 UNCONTROLLED TYPE 2 DIABETES MELLITUS WITH HYPERGLYCEMIA (H): Primary | ICD-10-CM

## 2019-06-26 DIAGNOSIS — E11.29 MICROALBUMINURIA DUE TO TYPE 2 DIABETES MELLITUS (H): ICD-10-CM

## 2019-06-26 DIAGNOSIS — E11.49 OTHER DIABETIC NEUROLOGICAL COMPLICATION ASSOCIATED WITH TYPE 2 DIABETES MELLITUS (H): ICD-10-CM

## 2019-06-26 LAB
ANION GAP SERPL CALCULATED.3IONS-SCNC: 10 MMOL/L (ref 3–14)
BUN SERPL-MCNC: 14 MG/DL (ref 7–30)
CALCIUM SERPL-MCNC: 9 MG/DL (ref 8.5–10.1)
CHLORIDE SERPL-SCNC: 97 MMOL/L (ref 94–109)
CO2 SERPL-SCNC: 25 MMOL/L (ref 20–32)
CREAT SERPL-MCNC: 0.64 MG/DL (ref 0.66–1.25)
GFR SERPL CREATININE-BSD FRML MDRD: >90 ML/MIN/{1.73_M2}
GLUCOSE SERPL-MCNC: 441 MG/DL (ref 70–99)
POTASSIUM SERPL-SCNC: 4.5 MMOL/L (ref 3.4–5.3)
SODIUM SERPL-SCNC: 132 MMOL/L (ref 133–144)

## 2019-06-26 PROCEDURE — 36415 COLL VENOUS BLD VENIPUNCTURE: CPT | Performed by: INTERNAL MEDICINE

## 2019-06-26 PROCEDURE — 99215 OFFICE O/P EST HI 40 MIN: CPT | Performed by: INTERNAL MEDICINE

## 2019-06-26 PROCEDURE — 80048 BASIC METABOLIC PNL TOTAL CA: CPT | Performed by: INTERNAL MEDICINE

## 2019-06-26 RX ORDER — LISINOPRIL 20 MG/1
20 TABLET ORAL DAILY
Qty: 90 TABLET | Refills: 1 | Status: SHIPPED | OUTPATIENT
Start: 2019-06-26 | End: 2020-02-19

## 2019-06-26 RX ORDER — GABAPENTIN 300 MG/1
CAPSULE ORAL
Qty: 90 CAPSULE | Refills: 3 | Status: SHIPPED | OUTPATIENT
Start: 2019-06-26 | End: 2021-05-28

## 2019-06-26 RX ORDER — ROSUVASTATIN CALCIUM 10 MG/1
10 TABLET, COATED ORAL DAILY
Qty: 30 TABLET | Refills: 2 | Status: SHIPPED | OUTPATIENT
Start: 2019-06-26 | End: 2020-02-11

## 2019-06-26 RX ORDER — METFORMIN HCL 500 MG
1000 TABLET, EXTENDED RELEASE 24 HR ORAL 2 TIMES DAILY WITH MEALS
Qty: 360 TABLET | Refills: 1 | Status: SHIPPED | OUTPATIENT
Start: 2019-06-26 | End: 2020-02-19

## 2019-06-26 RX ORDER — INSULIN GLARGINE 100 [IU]/ML
12 INJECTION, SOLUTION SUBCUTANEOUS DAILY
Qty: 9 ML | Refills: 0 | Status: SHIPPED | OUTPATIENT
Start: 2019-06-26 | End: 2021-08-27

## 2019-06-26 SDOH — SOCIAL STABILITY - SOCIAL INSECURITY: DISRUPTION OF FAMILY BY SEPARATION AND DIVORCE: Z63.5

## 2019-06-26 ASSESSMENT — MIFFLIN-ST. JEOR: SCORE: 1570.54

## 2019-06-26 NOTE — PATIENT INSTRUCTIONS
Call your insurance:   -check about bill from our last visit. Check what your visit cost should be.  -how often is diabetes education covered and is there extra cost for this  -how is coverage for insulin glargine and invokana.    My recommendation:  -check sugars 4 times daily for 1-2 weeks, AM, 2 hours after each meal.  -see diabetes educator to review numbers and consider getting a new meter.   -If sugars elevated, start insulin.     Try to avoid the ibuprofen. You may take this on occasion. It can affect the kidneys.    Increase gabapentin to 1 capsule 2 times daily and bedtime for 1-7 days, then increase to 2 capsules for the bedtime dose.    Start crestor 10 mg daily at suppertime.

## 2019-06-26 NOTE — TELEPHONE ENCOUNTER
Please call him. Glucose from clinic is 441. I suspect his meter is not working.   I will send a rx for insulin glargine pen to his pharmacy. I recommend he pick this up prior to visit with diab ed so they can teach him how to use this. diab ed will help him taper this up as needed based on his sugars at home.  Jo Rosado M.D.

## 2019-06-26 NOTE — PROGRESS NOTES
Subjective     Latrell Knowles is a 54 year old male who presents to clinic today for the following health issues:    HPI   Diabetes Follow-up      How often are you checking your blood sugar? One time daily 150-160 1 hour after eating.    What time of day are you checking your blood sugars (select all that apply)?  Before and after meals    Have you had any blood sugars above 200?  No    Have you had any blood sugars below 70?  No    What symptoms do you notice when your blood sugar is low?  None    What concerns do you have today about your diabetes? None     Do you have any of these symptoms? (Select all that apply)  No numbness or tingling in feet.  No redness, sores or blisters on feet.  No complaints of excessive thirst.  No reports of blurry vision.  No significant changes to weight.     Have you had a diabetic eye exam in the last 12 months? Yes- Date of last eye exam: 6/25/18     Reviewed A1C which does not correlate with his glucose readings at home.     BP Readings from Last 2 Encounters:   06/26/19 110/60   05/29/19 115/80     Hemoglobin A1C (%)   Date Value   05/29/2019 11.8 (H)   06/27/2018 9.2 (H)     LDL Cholesterol Calculated (mg/dL)   Date Value   05/29/2019 94   01/03/2018 76       Diabetes Management Resources    Amount of exercise or physical activity: 6-7 days/week for an average of greater than 60 minutes at gym and other days walk for 1/2 mile    Problems taking medications regularly: No    Medication side effects: none    Diet: diabetic    Denies polydipsia    Hyperlipidemia Follow-Up      Are you having any of the following symptoms? (Select all that apply)  No complaints of shortness of breath, chest pain or pressure.  No increased sweating or nausea with activity.  No left-sided neck or arm pain.  No complaints of pain in calves when walking 1-2 blocks.    Are you regularly taking any medication or supplement to lower your cholesterol?   No    Are you having muscle aches or other side  effects that you think could be caused by your cholesterol lowering medication?  No    Hypertension Follow-up      Do you check your blood pressure regularly outside of the clinic? No     Are you following a low salt diet? Yes    Are your blood pressures ever more than 140 on the top number (systolic) OR more   than 90 on the bottom number (diastolic), for example 140/90? No    Patient Active Problem List   Diagnosis     Type 2 diabetes mellitus with diabetic nephropathy, without long-term current use of insulin (H)     HYPERLIPIDEMIA LDL GOAL <100     CKD (chronic kidney disease) stage 1, GFR 90 ml/min or greater     ED (erectile dysfunction)     Past Surgical History:   Procedure Laterality Date     C LAMINECTOMY,>2 SGMT,LUMBAR  1997       Social History     Tobacco Use     Smoking status: Never Smoker     Smokeless tobacco: Never Used   Substance Use Topics     Alcohol use: Yes     Comment: rarely     Family History   Problem Relation Age of Onset     Diabetes Type 2  Brother      Diabetes Type 2  Mother         age 50's     Diabetes Type 2  Sister      Cancer - colorectal No family hx of      Glaucoma No family hx of      Macular Degeneration No family hx of          Current Outpatient Medications   Medication Sig Dispense Refill     aspirin 81 MG tablet Take 324 mg by mouth once       lisinopril (PRINIVIL/ZESTRIL) 20 MG tablet Take 1 tablet (20 mg) by mouth daily 30 tablet 3     sildenafil (REVATIO) 20 MG tablet Take 1-2 tablets 30 minutes to 4 hours before intercourse. 20 tablet 3     blood glucose (ACCU-CHEK SMARTVIEW) test strip 1 strip by In Vitro route 4 times daily (before meals and nightly) Use to test blood sugar 4 times daily or as directed. 3 Box prn     blood glucose monitoring (ACCU-CHEK FASTCLIX) lancets 1 each 4 times daily Use to test blood sugar 4 times daily or as directed. 3 Box 3     canagliflozin (INVOKANA) 100 MG tablet Take 1 tablet (100 mg) by mouth every morning (before breakfast) 30  "tablet 3     gabapentin (NEURONTIN) 300 MG capsule Take 1 capsule (300 mg) by mouth 2 times daily 60 capsule 3     metFORMIN (GLUCOPHAGE-XR) 500 MG 24 hr tablet Take 2 tablets (1,000 mg) by mouth 2 times daily (with meals) 360 tablet 3     traMADol (ULTRAM) 50 MG tablet Take 1 tablet (50 mg) by mouth every 6 hours as needed for moderate to severe pain Every 4-6 hours as needed 30 tablet 0     Discussed his stressors with his divorce from his wife. His adult daughters are having a difficult time with this as well, which has surprised him.    Reviewed and updated as needed this visit by Provider         Review of Systems   ROS COMP: Constitutional, HEENT, cardiovascular, pulmonary, gi and gu systems are negative, except as otherwise noted.      Objective    /60 (Cuff Size: Adult Regular)   Pulse 80   Temp 97.9  F (36.6  C) (Tympanic)   Ht 1.77 m (5' 9.68\")   Wt 73.4 kg (161 lb 14.4 oz)   SpO2 99%   BMI 23.44 kg/m    Body mass index is 23.44 kg/m .  Physical Exam   GENERAL: healthy, alert and no distress  NECK: no adenopathy, no asymmetry, masses, or scars and thyroid normal to palpation  RESP: lungs clear to auscultation - no rales, rhonchi or wheezes  CV: regular rate and rhythm, normal S1 S2, no S3 or S4, no murmur, click or rub, no peripheral edema and peripheral pulses strong  ABDOMEN: soft, nontender, no hepatosplenomegaly, no masses and bowel sounds normal  MS: no gross musculoskeletal defects noted, no edema    Diagnostic Test Results:  Labs reviewed in Epic  Results for orders placed or performed in visit on 06/26/19 (from the past 24 hour(s))   Basic metabolic panel  (Ca, Cl, CO2, Creat, Gluc, K, Na, BUN)   Result Value Ref Range    Sodium 132 (L) 133 - 144 mmol/L    Potassium 4.5 3.4 - 5.3 mmol/L    Chloride 97 94 - 109 mmol/L    Carbon Dioxide 25 20 - 32 mmol/L    Anion Gap 10 3 - 14 mmol/L    Glucose 441 (H) 70 - 99 mg/dL    Urea Nitrogen 14 7 - 30 mg/dL    Creatinine 0.64 (L) 0.66 - 1.25 mg/dL "    GFR Estimate >90 >60 mL/min/[1.73_m2]    GFR Estimate If Black >90 >60 mL/min/[1.73_m2]    Calcium 9.0 8.5 - 10.1 mg/dL           Assessment & Plan     1. Uncontrolled type 2 diabetes mellitus with hyperglycemia (H)  During visit, discussed disparity between his A1C and glucose readings at home. Asked him to check his sugars at home and scheduled him for diab ed in 2 days. After he left clinic, glucose from today's draw came back significantly elevated. Plan start insulin glargine. Telephone encounter done. See for details.  - lisinopril (PRINIVIL/ZESTRIL) 20 MG tablet; Take 1 tablet (20 mg) by mouth daily  Dispense: 90 tablet; Refill: 1  - rosuvastatin (CRESTOR) 10 MG tablet; Take 1 tablet (10 mg) by mouth daily  Dispense: 30 tablet; Refill: 2  - CARE COORDINATION REFERRAL  - Comprehensive metabolic panel (BMP + Alb, Alk Phos, ALT, AST, Total. Bili, TP); Future  - Lipid panel reflex to direct LDL Fasting; Future  - **A1C FUTURE anytime; Future    2. Microalbuminuria due to type 2 diabetes mellitus (H)  Continue ACEI. Recheck microalbumin at next visit.   - Basic metabolic panel  (Ca, Cl, CO2, Creat, Gluc, K, Na, BUN)  - lisinopril (PRINIVIL/ZESTRIL) 20 MG tablet; Take 1 tablet (20 mg) by mouth daily  Dispense: 90 tablet; Refill: 1    3. Failed back surgical syndrome  Refilled. I think gabapentin is improving his pain control for his back pain, and also his diabetic neuropathy. I did refill his tramadol recently, but discussed that the gabapentin would be a better long term option for this.  - gabapentin (NEURONTIN) 300 MG capsule; Take 1 capsule (300 mg) by mouth 2 times daily AND 2 capsules (600 mg) At Bedtime.  Dispense: 90 capsule; Refill: 3    4. Other diabetic neurological complication associated with type 2 diabetes mellitus (H)      5. Marital disruption involving divorce  He has been quite bothered by the stress with his family, although he gets along with his wife, who has moved out. I have strongly  encouraged him to seek counseling to help him better understand his own emotions, and how to talk with and interpret his family. He is willing to consider. I'm concerned he won't have reasonable coverage for this. Will send to care coordination.      Patient Instructions   Call your insurance:   -check about bill from our last visit. Check what your visit cost should be.  -how often is diabetes education covered and is there extra cost for this  -how is coverage for insulin glargine and invokana.    My recommendation:  -check sugars 4 times daily for 1-2 weeks, AM, 2 hours after each meal.  -see diabetes educator to review numbers and consider getting a new meter.   -If sugars elevated, start insulin.     Try to avoid the ibuprofen. You may take this on occasion. It can affect the kidneys.    Increase gabapentin to 1 capsule 2 times daily and bedtime for 1-7 days, then increase to 2 capsules for the bedtime dose.    Start crestor 10 mg daily at suppertime.      Return in about 2 months (around 8/29/2019) for Follow Up Visit, Lab Work.     I spent 50 minutes with this patient face-to-face, of which 50% or greater was spent in counseling and coordination of care with regards to diabetes, lipids, neuropathy, marital and family stress. Please see plan above.      Jo Rosado MD  [facility]

## 2019-06-27 ENCOUNTER — PATIENT OUTREACH (OUTPATIENT)
Dept: CARE COORDINATION | Facility: CLINIC | Age: 54
End: 2019-06-27

## 2019-06-27 ASSESSMENT — ACTIVITIES OF DAILY LIVING (ADL): DEPENDENT_IADLS:: INDEPENDENT

## 2019-06-27 NOTE — LETTER
[facility] CARE COORDINATION    June 27, 2019    Latrell Knowles  [address] [place] [address]      Dear Latrell,    I am a clinic care coordinator who works with Jo Rosado MD at the [facility]. I wanted to thank you for spending the time to talk with me.  I wanted to introduce myself and provide you with my contact information so that you can call me with questions or concerns about your health care. Included is a flyer with a description of clinic care coordination and how I can further assist you.     [facility] group  Mondays at 6:30 pm  Next meeting: Monday, September 09, 2019   6:30 pm - 8:30 pm   View meeting schedule  [facility]  [address] [place] [address]   Directions   :Almaz Acuña, Leader [phone]    Registration fee=$30.00 (Includes workbook)  Scholarships available  Meeting schedule  Meeting Date Time  Session 1 Mon, Sep 09, 2019 6:30 pm - 8:30 pm  Session 2 Mon, Sep 16, 2019 6:30 pm - 8:30 pm  Session 3 Mon, Sep 23, 2019 6:30 pm - 8:30 pm  Session 4 Mon, Sep 30, 2019 6:30 pm - 8:30 pm  Session 5 Mon, Oct 07, 2019 6:30 pm - 8:30 pm  Session 6 Mon, Oct 14, 2019 6:30 pm - 8:30 pm  Session 7 Mon, Oct 21, 2019 6:30 pm - 8:30 pm  Session 8 Mon, Oct 28, 2019 6:30 pm - 8:30 pm  Session 9 Mon, Nov 04, 2019 6:30 pm - 8:30 pm  Session 10 Mon, Nov 11, 2019 6:30 pm - 8:30 pm  Session 11 Mon, Nov 18, 2019 6:30 pm - 8:30 pm  Session 12 Mon, Nov 25, 2019 6:30 pm - 8:30 pm  Session 13 Mon, Dec 02, 2019 6:30 pm - 8:30 pm            [facility]  Mondays at 6:30 pm  Monday, September 09, 2019   6:30 pm - 8:00 pm   View meeting schedule  Group location  [address] [address]   Directions  : Patricia Dobbins, [facility]  [phone]  Send message  Registration fee - $15.00 (Includes workbook)  [place] [phone]     Welcome to  [facility] at [facility].   We've established a comfortable, easy workshop that allows participants to explore and work through the process and circumstances surrounding their separation or divorce.    Meeting schedule  Meeting Date Time  Session 1 Mon, Sep 09, 2019 6:30 pm - 8:00 pm  Session 2 Mon, Sep 16, 2019 6:30 pm - 8:00 pm  Session 3 Mon, Sep 23, 2019 6:30 pm - 8:00 pm  Session 4 Mon, Sep 30, 2019 6:30 pm - 8:00 pm  Session 5 Mon, Oct 07, 2019 6:30 pm - 8:00 pm  Session 6 Mon, Oct 14, 2019 6:30 pm - 8:00 pm  Session 7 Mon, Oct 21, 2019 6:30 pm - 8:00 pm  Session 8 Mon, Oct 28, 2019 6:30 pm - 8:00 pm  Session 9 Mon, Nov 04, 2019 6:30 pm - 8:00 pm  Session 10 Mon, Nov 11, 2019 6:30 pm - 8:00 pm  Session 11 Mon, Nov 18, 2019 6:30 pm - 8:00 pm  Special Event Mon, Nov 25, 2019 6:30 pm - 8:00 pm  Session 12 Mon, Dec 02, 2019 6:30 pm - 8:00 pm  Session 13 Mon, Dec 09, 2019 6:30 pm - 8:00 pm      For additional options you can contact [employer] at [phone].    Please feel free to contact me at [phone], with any questions or concerns.     Sincerely,     Corrie Bobby

## 2019-06-27 NOTE — TELEPHONE ENCOUNTER
Called and talked with patient and gave message, he will  insulin and being to diabetes appt tomorrow.  Krystina Severino LPN

## 2019-06-27 NOTE — PROGRESS NOTES
Clinic Care Coordination Contact  OUTREACH    Referral Information: Mental Wellness: [facility]-going through divorce, has daughters. Interested in support services.     Chief Complaint   Patient presents with     Clinic Care Coordination - Initial     Psychosocial Support      Universal Utilization: No major concerns noted.  Utilization    Last refreshed: 6/27/2019  9:24 AM:  Hospital Admissions 0           Last refreshed: 6/27/2019  9:24 AM:  ED Visits 1           Last refreshed: 6/27/2019  9:24 AM:  No Show Count (past year) 3              Current as of: 6/27/2019  9:24 AM            Clinical Concerns:  Current Medical Concerns: Pt was seen in clinic yesterday for a diabetic follow-up. During the visit pt voiced interest in finding support as he is currently going through a divorce.   Health Maintenance Reviewed: Due/Overdue     Living Situation:  Current living arrangement: I live in a private home    Transportation:  Transportation concerns: No  Transportation means: Regular car     Psychosocial:  Holiness or spiritual beliefs that impact treatment:: No  Mental health DX: No  Mental health management concern: Yes, some concerns primarily related to a divorce. Pt reports an interest in getting connected with a support group.   Informal Support system:: Children, Family  Financial/Insurance: Pt denies any outstanding financial concerns at this time.      Resources and Interventions:  Referrals Placed: Community Resources     Patient/Caregiver understanding: Pt reports understanding and denies any additional questions or concerns at this times. NEREIDA ANGEL engaged in AIDET communication during encounter.     Future Appointments              Tomorrow  DIABETIC ED RESOURCE [facility] [place]    In 2 months [place] LAB [facility] [place]    In 2 months [place] cal[place]xt[place] R[place]; Jo Rosado MD; [place] EXAM ROOM [address] [facility] [place]        Plan: NEREIDA ANGEL will send pt  information via mail. SW CC will be available to pt if he has any questions or concerns about the information that was sent. No further outreaches will be made at this time unless a new referral is made or a change in the pt's status occurs. Patient was provided with this writer's contact information and encouraged to call with any questions or concerns.    Corrie Bobby, [facility]  Clinic Care Coordinator  [phone]

## 2019-06-28 ENCOUNTER — ALLIED HEALTH/NURSE VISIT (OUTPATIENT)
Dept: EDUCATION SERVICES | Facility: CLINIC | Age: 54
End: 2019-06-28
Payer: MEDICARE

## 2019-06-28 DIAGNOSIS — E11.21 TYPE 2 DIABETES MELLITUS WITH DIABETIC NEPHROPATHY, WITHOUT LONG-TERM CURRENT USE OF INSULIN (H): Primary | ICD-10-CM

## 2019-06-28 PROCEDURE — G0108 DIAB MANAGE TRN  PER INDIV: HCPCS

## 2019-06-28 RX ORDER — FLURBIPROFEN SODIUM 0.3 MG/ML
SOLUTION/ DROPS OPHTHALMIC
Qty: 100 EACH | Refills: 1 | Status: SHIPPED | OUTPATIENT
Start: 2019-06-28

## 2019-06-28 NOTE — PATIENT INSTRUCTIONS
You took your shot of basaglar today at 1:30pm.   -Tomorrow take your shot of basaglar at 11:30am  -The next day (Sunday) take your shot of basaglar at 9:30-10am    Start checking blood sugar 4 times/day - first thing in the morning, at noon, 6pm, & 9:30pm    Send in blood sugar log via [employer] in 2 weeks

## 2019-06-28 NOTE — PROGRESS NOTES
"Diabetes Self-Management Education & Support    Diabetes Education Self Management & Training    SUBJECTIVE/OBJECTIVE:  Presents for: Individual review  Accompanied by: Self  Diabetes education in the past 24mo: No  Focus of Visit: Taking Medication, Insulin Start  Insulin administration technique taught using: Pen  Insulin Type: Basaglar  Diabetes type: Type 2  Date of diagnosis: 2012  Disease course: Worsening  How confident are you filling out medical forms by yourself:: Not Assessed  Diabetes management related comments/concerns: I don't feel any different but my numbers are very high, in the 400s. I'm not testing blood sugar very often. I'm going through a divorce but things are finally getting moremanageable.   Transportation concerns: No  Other concerns:: English as a second language  Cultural Influences/Ethnic Background:      Diabetes Symptoms & Complications  Blurred vision: No  Fatigue: No  Neuropathy: Yes  Polydipsia: No  Polyphagia: No  Polyuria: No  Visual change: No  Weakness: No  Weight loss: No  Symptom course: Worsening  Weight trend: Stable  Autonomic neuropathy: Yes  CVA: No  Heart disease: No  Nephropathy: No  Peripheral neuropathy: Yes  Peripheral Vascular Disease: No  Retinopathy: No  Sexual dysfunction: Yes    Patient Problem List and Family Medical History reviewed for relevant medical history, current medical status, and diabetes risk factors.    Vitals:  There were no vitals taken for this visit.  Estimated body mass index is 23.44 kg/m  as calculated from the following:    Height as of 6/26/19: 1.77 m (5' 9.68\").    Weight as of 6/26/19: 73.4 kg (161 lb 14.4 oz).   Last 3 BP:   BP Readings from Last 3 Encounters:   06/26/19 110/60   05/29/19 115/80   01/26/19 (!) 149/92       History   Smoking Status     Never Smoker   Smokeless Tobacco     Never Used       Labs:  Lab Results   Component Value Date    A1C 11.8 05/29/2019     Lab Results   Component Value Date     06/26/2019 "     Lab Results   Component Value Date    LDL 94 05/29/2019     HDL Cholesterol   Date Value Ref Range Status   05/29/2019 76 >39 mg/dL Final   ]  GFR Estimate   Date Value Ref Range Status   06/26/2019 >90 >60 mL/min/[1.73_m2] Final     Comment:     Non  GFR Calc  Starting 12/18/2018, serum creatinine based estimated GFR (eGFR) will be   calculated using the Chronic Kidney Disease Epidemiology Collaboration   (CKD-EPI) equation.       GFR Estimate If Black   Date Value Ref Range Status   06/26/2019 >90 >60 mL/min/[1.73_m2] Final     Comment:      GFR Calc  Starting 12/18/2018, serum creatinine based estimated GFR (eGFR) will be   calculated using the Chronic Kidney Disease Epidemiology Collaboration   (CKD-EPI) equation.       Lab Results   Component Value Date    CR 0.64 06/26/2019     No results found for: MICROALBUMIN    Healthy Eating  Healthy Eating Assessed Today: Yes  Patient on a regular basis: Eats 3 meals a day  Meal planning: Smaller portions  Meals include: Breakfast  Breakfast: 9-10a [employer] - egg mcmuffin OR pancakes, coffee with 2 splenda  Lunch: 3p fajitas OR sandwich, fruit, lemonade OR orange juice   Dinner: 7p cereal with milk  Snacks: protein bar - strawberry   Other: wife used to do the cooking but since she left, he is eating less  Beverages: Coffee, Milk, Juice  Has patient met with a dietitian in the past?: No    Being Active  Being Active Assessed Today: Yes  Exercise:: Yes  Days per week of moderate to strenuous exercise (like a brisk walk): 3(going to gym 3 days/week; also walking every day he doesn't go to the gym)  On average, minutes per day of exercise at this level: 90  How intense was your typical exercise? : Heavy (like jogging or swimming  Exercise Minutes per Week: 270  Barrier to exercise: None    Monitoring  Monitoring Assessed Today: Yes  Did patient bring glucose meter to appointment? : Yes  Blood Glucose Meter: One Touch(Daughter shares  glucometer with patient so some numbers are hers and some are his.)  Home Glucose (Sugar) Monitorin-2 times per week  Overall Range (mg/dL): >200      Taking Medications  Diabetes Medication(s)     Biguanides       metFORMIN (GLUCOPHAGE-XR) 500 MG 24 hr tablet    Take 2 tablets (1,000 mg) by mouth 2 times daily (with meals)    Insulin       insulin glargine (BASAGLAR KWIKPEN) 100 UNIT/ML pen    Inject 12 Units Subcutaneous daily    Sodium-Glucose Co-Transporter 2 (SGLT2) Inhibitors       canagliflozin (INVOKANA) 100 MG tablet    Take 1 tablet (100 mg) by mouth every morning (before breakfast)     Patient not taking:  Reported on 2019          Taking Medication Assessed Today: Yes  Current Treatments: Insulin Injections, Oral Agent (monotherapy)  Dose schedule: pre-breakfast, pre-dinner  Problems taking diabetes medications regularly?: No  Diabetes medication side effects?: No  Treatment Compliance: Most of the time    Problem Solving  Problem Solving Assessed Today: Yes  Hypoglycemia Frequency: Never    Reducing Risks  Reducing Risks Assessed Today: No  Has dilated eye exam at least once a year?: No    Healthy Coping  Healthy Coping Assessed Today: No  Patient Activation Measure Survey Score:  No flowsheet data found.    ASSESSMENT:  Patient comes in today saying yes, he knows his blood sugar is too high. He thinks that this is likely from the stress of his divorce. He is exercising every day, either going to the gym or walking. He states he is eating less now since his wife isn't cooking for him. He has been on insulin in the past but doesn't remember how to give an injection. He is also sharing his glucometer with his daughter which results in varying readings in the log.     Patient's most recent   Lab Results   Component Value Date    A1C 11.8 2019    is not meeting goal of <7.0    INTERVENTION:   Diabetes knowledge and skills assessment:     Patient is knowledgeable in diabetes management  concepts related to: Healthy Eating and Being Active    Patient needs further education on the following diabetes management concepts: Healthy Eating, Being Active, Monitoring, Taking Medication, Problem Solving, Reducing Risks and Healthy Coping    Based on learning assessment above, most appropriate setting for further diabetes education would be: Group class or Individual setting.    Education provided today on:  AADE Self-Care Behaviors:  Monitoring: proper technique, log and interpret results and frequency of monitoring  Problem Solving: low blood glucose - causes, signs/symptoms, treatment and prevention  Healthy Coping: methods for coping with stress  Patient was instructed on One Touch Verio Flex meter and was able to provide an accurate return demonstration. Patient's blood glucose reading today was 488 mg/dL.  Insulin administration technique taught today. Patient verbalized understanding and was able to perform an accurate return demonstration of administration technique.    Opportunities for ongoing education and support in diabetes-self management were discussed.    Pt verbalized understanding of concepts discussed and recommendations provided today.       Education Materials Provided:  Hypoglycemia Signs and Symptoms and One Touch Verio Flex meter kit    PLAN:  See Patient Instructions for co-developed, patient-stated behavior change goals.  AVS printed and provided to patient today. See Follow-Up section for recommended follow-up.    Tere Sagastume RD, LD   Time Spent: 40 minutes  Encounter Type: Individual    Any diabetes medication dose changes were made via the CDE Protocol and Collaborative Practice Agreement with the patient's referring provider. A copy of this encounter was shared with the provider.

## 2019-06-28 NOTE — TELEPHONE ENCOUNTER
Prescription approved per [facility] Refill Protocol.  Hira Grady, RN, BSN

## 2019-06-28 NOTE — TELEPHONE ENCOUNTER
"Requested Prescriptions   Pending Prescriptions Disp Refills     B-D U/F insulin pen needle [Pharmacy Med Name: BD Pen Needle Mini U/F Miscellaneous 31G X 5 MM]  Last Written Prescription Date:  na  Last Fill Quantity: na,  # refills: na   Last Office Visit: 6/26/2019 Jo Rosado MD   Future Office Visit:    Next 5 appointments (look out 90 days)    Sep 04, 2019 10:20 AM CDT  Office Visit with Jo Rosado MD, Ea Rn Pal 3a, EA EXAM ROOM 37  [facility] [address] [place] [address]  [address]  [facility] [address]  [phone]          100 each 0     Sig: USE 1X DAILY       Diabetic Supplies Protocol Failed - 6/28/2019 12:26 PM        Failed - Medication is active on med list        Passed - Patient is 18 years of age or older        Passed - Recent (6 mo) or future (30 days) visit within the authorizing provider's specialty     Patient had office visit in the last 6 months or has a visit in the next 30 days with authorizing provider.  See \"Patient Info\" tab in inbasket, or \"Choose Columns\" in Meds & Orders section of the refill encounter.              "

## 2020-02-11 DIAGNOSIS — E11.65 UNCONTROLLED TYPE 2 DIABETES MELLITUS WITH HYPERGLYCEMIA (H): ICD-10-CM

## 2020-02-11 RX ORDER — ROSUVASTATIN CALCIUM 10 MG/1
10 TABLET, COATED ORAL DAILY
Qty: 30 TABLET | Refills: 2 | Status: SHIPPED | OUTPATIENT
Start: 2020-02-11 | End: 2021-05-28

## 2020-02-11 NOTE — TELEPHONE ENCOUNTER
"Requested Prescriptions   Pending Prescriptions Disp Refills     rosuvastatin (CRESTOR) 10 MG tablet 30 tablet 2     Sig: Take 1 tablet (10 mg) by mouth daily       Statins Protocol Passed - 2/11/2020  2:47 PM        Passed - LDL on file in past 12 months     Recent Labs   Lab Test 05/29/19  1059   LDL 94             Passed - No abnormal creatine kinase in past 12 months     No lab results found.             Passed - Recent (12 mo) or future (30 days) visit within the authorizing provider's specialty     Patient has had an office visit with the authorizing provider or a provider within the authorizing providers department within the previous 12 mos or has a future within next 30 days. See \"Patient Info\" tab in inbasket, or \"Choose Columns\" in Meds & Orders section of the refill encounter.              Passed - Medication is active on med list        Passed - Patient is age 18 or older      Prescription approved per [facility] Refill Protocol.    Sadaf Morgan RN   [facility]   "

## 2020-02-14 DIAGNOSIS — E11.65 UNCONTROLLED TYPE 2 DIABETES MELLITUS WITH HYPERGLYCEMIA (H): ICD-10-CM

## 2020-02-14 DIAGNOSIS — E11.29 MICROALBUMINURIA DUE TO TYPE 2 DIABETES MELLITUS (H): ICD-10-CM

## 2020-02-14 DIAGNOSIS — R80.9 MICROALBUMINURIA DUE TO TYPE 2 DIABETES MELLITUS (H): ICD-10-CM

## 2020-02-18 NOTE — TELEPHONE ENCOUNTER
"Failed protocols    Requested Prescriptions   Pending Prescriptions Disp Refills     lisinopril (ZESTRIL) 20 MG tablet 90 tablet 1     Sig: Take 1 tablet (20 mg) by mouth daily       ACE Inhibitors (Including Combos) Protocol Failed - 2/14/2020  3:39 PM        Failed - Normal serum creatinine on file in past 12 months     Recent Labs   Lab Test 06/26/19  1055   CR 0.64*             Passed - Blood pressure under 140/90 in past 12 months     BP Readings from Last 3 Encounters:   06/26/19 110/60   05/29/19 115/80   01/26/19 (!) 149/92                 Passed - Recent (12 mo) or future (30 days) visit within the authorizing provider's specialty     Patient has had an office visit with the authorizing provider or a provider within the authorizing providers department within the previous 12 mos or has a future within next 30 days. See \"Patient Info\" tab in inbasket, or \"Choose Columns\" in Meds & Orders section of the refill encounter.              Passed - Medication is active on med list        Passed - Patient is age 18 or older        Passed - Normal serum potassium on file in past 12 months     Recent Labs   Lab Test 06/26/19  1055   POTASSIUM 4.5             metFORMIN (GLUCOPHAGE-XR) 500 MG 24 hr tablet 360 tablet 1     Sig: Take 2 tablets (1,000 mg) by mouth 2 times daily (with meals)       Biguanide Agents Failed - 2/14/2020  3:39 PM        Failed - Blood pressure less than 140/90 in past 6 months     BP Readings from Last 3 Encounters:   06/26/19 110/60   05/29/19 115/80   01/26/19 (!) 149/92                 Failed - Patient has had a Microalbumin in the past 15 mos.     Recent Labs   Lab Test 06/27/18  1055   MICROL 17   UMALCR 26.98*             Failed - Patient has documented A1c within the specified period of time.     If HgbA1C is 8 or greater, it needs to be on file within the past 3 months.  If less than 8, must be on file within the past 6 months.     Recent Labs   Lab Test 05/29/19  1059   A1C 11.8*      " "       Failed - Recent (6 mo) or future (30 days) visit within the authorizing provider's specialty     Patient had office visit in the last 6 months or has a visit in the next 30 days with authorizing provider or within the authorizing provider's specialty.  See \"Patient Info\" tab in inbasket, or \"Choose Columns\" in Meds & Orders section of the refill encounter.            Passed - Patient has documented LDL within the past 12 mos.     Recent Labs   Lab Test 05/29/19  1059   LDL 94             Passed - Patient is age 10 or older        Passed - Patient's CR is NOT>1.4 OR Patient's EGFR is NOT<45 within past 12 mos.     Recent Labs   Lab Test 06/26/19  1055   GFRESTIMATED >90   GFRESTBLACK >90       Recent Labs   Lab Test 06/26/19  1055   CR 0.64*             Passed - Patient does NOT have a diagnosis of CHF.        Passed - Medication is active on med list          "

## 2020-02-19 RX ORDER — LISINOPRIL 20 MG/1
20 TABLET ORAL DAILY
Qty: 90 TABLET | Refills: 1 | Status: SHIPPED | OUTPATIENT
Start: 2020-02-19 | End: 2021-05-28

## 2020-02-19 RX ORDER — METFORMIN HCL 500 MG
1000 TABLET, EXTENDED RELEASE 24 HR ORAL 2 TIMES DAILY WITH MEALS
Qty: 360 TABLET | Refills: 1 | Status: SHIPPED | OUTPATIENT
Start: 2020-02-19 | End: 2021-05-28

## 2020-02-19 NOTE — TELEPHONE ENCOUNTER
Had moved out of town. Now I see rx request. Please call him and help make appointment if he has moved back. If not, needs to establish new primary care.  Jo Rosado M.D.

## 2020-02-20 NOTE — TELEPHONE ENCOUNTER
Left message for patient to call back.        Please inform patient:      A refill of your metFORMIN 500 MG PO 24 hr tablet and lisinopril 20 MG PO tablet have been sent to your pharmacy.       Had moved out of town. Now I see rx request. Please call him and help make appointment if he has moved back. If not, needs to establish new primary care.  Jo Rosado M.D.    Melanie Ross, CMA

## 2020-02-24 NOTE — TELEPHONE ENCOUNTER
Spoke with patient and informed him him of refills and message. Patient expressed understanding. Patient is currently working in [place]. Patient daughter  his prescription. Patient will establish care with a new provider.       Melanie Ross CMA

## 2020-02-27 ENCOUNTER — TELEPHONE (OUTPATIENT)
Dept: PEDIATRICS | Facility: CLINIC | Age: 55
End: 2020-02-27

## 2020-02-27 NOTE — TELEPHONE ENCOUNTER
CHG called patient on PCPs request to attempt to schedule an appointment.     CHG was able to talk to patient over the phone, they said they were in [place] now but would be back to visit family the week of March 13. Dr Vale did not have any openings at the time of the call and CHG informed patient of this. They said they would call back to schedule an appointment when the next needed refills of their medications. Patient was eager to end the call and get off of the phone.     CHG huddled with PCP after phone call. They said they would be okay with having CHG see them in clinic while the PCP was there and they would try to talk to the patient quickly if they had time.    CHG will call patient again tomorrow (02/28/2020) to relay the message.    Shyam Razo at 1:14 PM on 2/27/2020  [facility] Health Guide  Phone [phone]

## 2021-05-28 ENCOUNTER — OFFICE VISIT (OUTPATIENT)
Dept: INTERNAL MEDICINE | Facility: CLINIC | Age: 56
End: 2021-05-28
Payer: MEDICARE

## 2021-05-28 VITALS
WEIGHT: 173.4 LBS | SYSTOLIC BLOOD PRESSURE: 138 MMHG | BODY MASS INDEX: 24.82 KG/M2 | HEART RATE: 63 BPM | TEMPERATURE: 98.1 F | DIASTOLIC BLOOD PRESSURE: 89 MMHG | HEIGHT: 70 IN | RESPIRATION RATE: 18 BRPM | OXYGEN SATURATION: 99 %

## 2021-05-28 DIAGNOSIS — Z00.00 ENCOUNTER FOR MEDICARE ANNUAL WELLNESS EXAM: Primary | ICD-10-CM

## 2021-05-28 DIAGNOSIS — E11.65 UNCONTROLLED TYPE 2 DIABETES MELLITUS WITH HYPERGLYCEMIA (H): ICD-10-CM

## 2021-05-28 DIAGNOSIS — R80.9 MICROALBUMINURIA DUE TO TYPE 2 DIABETES MELLITUS (H): ICD-10-CM

## 2021-05-28 DIAGNOSIS — Z12.5 SCREENING PSA (PROSTATE SPECIFIC ANTIGEN): ICD-10-CM

## 2021-05-28 DIAGNOSIS — E11.29 MICROALBUMINURIA DUE TO TYPE 2 DIABETES MELLITUS (H): ICD-10-CM

## 2021-05-28 DIAGNOSIS — Z79.899 MEDICATION MANAGEMENT: ICD-10-CM

## 2021-05-28 DIAGNOSIS — M96.1 FAILED BACK SURGICAL SYNDROME: ICD-10-CM

## 2021-05-28 DIAGNOSIS — E11.21 TYPE 2 DIABETES MELLITUS WITH DIABETIC NEPHROPATHY, WITHOUT LONG-TERM CURRENT USE OF INSULIN (H): ICD-10-CM

## 2021-05-28 DIAGNOSIS — E78.5 HYPERLIPIDEMIA LDL GOAL <100: ICD-10-CM

## 2021-05-28 LAB
ALBUMIN SERPL-MCNC: 3.7 G/DL (ref 3.4–5)
ALP SERPL-CCNC: 86 U/L (ref 40–150)
ALT SERPL W P-5'-P-CCNC: 28 U/L (ref 0–70)
ANION GAP SERPL CALCULATED.3IONS-SCNC: 3 MMOL/L (ref 3–14)
AST SERPL W P-5'-P-CCNC: 18 U/L (ref 0–45)
BILIRUB SERPL-MCNC: 0.5 MG/DL (ref 0.2–1.3)
BUN SERPL-MCNC: 13 MG/DL (ref 7–30)
CALCIUM SERPL-MCNC: 8.9 MG/DL (ref 8.5–10.1)
CHLORIDE SERPL-SCNC: 103 MMOL/L (ref 94–109)
CHOLEST SERPL-MCNC: 173 MG/DL
CO2 SERPL-SCNC: 32 MMOL/L (ref 20–32)
CREAT SERPL-MCNC: 0.75 MG/DL (ref 0.66–1.25)
ERYTHROCYTE [DISTWIDTH] IN BLOOD BY AUTOMATED COUNT: 13.5 % (ref 10–15)
GFR SERPL CREATININE-BSD FRML MDRD: >90 ML/MIN/{1.73_M2}
GLUCOSE SERPL-MCNC: 120 MG/DL (ref 70–99)
HBA1C MFR BLD: 7.9 % (ref 0–5.6)
HCT VFR BLD AUTO: 46.5 % (ref 40–53)
HDLC SERPL-MCNC: 60 MG/DL
HGB BLD-MCNC: 15.8 G/DL (ref 13.3–17.7)
LDLC SERPL CALC-MCNC: 71 MG/DL
MCH RBC QN AUTO: 29 PG (ref 26.5–33)
MCHC RBC AUTO-ENTMCNC: 34 G/DL (ref 31.5–36.5)
MCV RBC AUTO: 86 FL (ref 78–100)
NONHDLC SERPL-MCNC: 113 MG/DL
PLATELET # BLD AUTO: 170 10E9/L (ref 150–450)
POTASSIUM SERPL-SCNC: 4.1 MMOL/L (ref 3.4–5.3)
PROT SERPL-MCNC: 7.8 G/DL (ref 6.8–8.8)
RBC # BLD AUTO: 5.44 10E12/L (ref 4.4–5.9)
SODIUM SERPL-SCNC: 138 MMOL/L (ref 133–144)
TRIGL SERPL-MCNC: 208 MG/DL
TSH SERPL DL<=0.005 MIU/L-ACNC: 0.97 MU/L (ref 0.4–4)
WBC # BLD AUTO: 6.7 10E9/L (ref 4–11)

## 2021-05-28 PROCEDURE — 99207 PR FOOT EXAM NO CHARGE: CPT | Mod: 25 | Performed by: INTERNAL MEDICINE

## 2021-05-28 PROCEDURE — 99213 OFFICE O/P EST LOW 20 MIN: CPT | Mod: 25 | Performed by: INTERNAL MEDICINE

## 2021-05-28 PROCEDURE — G0438 PPPS, INITIAL VISIT: HCPCS | Performed by: INTERNAL MEDICINE

## 2021-05-28 PROCEDURE — G0103 PSA SCREENING: HCPCS | Performed by: INTERNAL MEDICINE

## 2021-05-28 PROCEDURE — 80061 LIPID PANEL: CPT | Performed by: INTERNAL MEDICINE

## 2021-05-28 PROCEDURE — 84443 ASSAY THYROID STIM HORMONE: CPT | Performed by: INTERNAL MEDICINE

## 2021-05-28 PROCEDURE — 85027 COMPLETE CBC AUTOMATED: CPT | Performed by: INTERNAL MEDICINE

## 2021-05-28 PROCEDURE — 36415 COLL VENOUS BLD VENIPUNCTURE: CPT | Performed by: INTERNAL MEDICINE

## 2021-05-28 PROCEDURE — 83036 HEMOGLOBIN GLYCOSYLATED A1C: CPT | Performed by: INTERNAL MEDICINE

## 2021-05-28 PROCEDURE — 80053 COMPREHEN METABOLIC PANEL: CPT | Performed by: INTERNAL MEDICINE

## 2021-05-28 PROCEDURE — 82043 UR ALBUMIN QUANTITATIVE: CPT | Performed by: INTERNAL MEDICINE

## 2021-05-28 RX ORDER — ROSUVASTATIN CALCIUM 10 MG/1
10 TABLET, COATED ORAL DAILY
Qty: 90 TABLET | Refills: 1 | Status: SHIPPED | OUTPATIENT
Start: 2021-05-28

## 2021-05-28 RX ORDER — METFORMIN HCL 500 MG
1000 TABLET, EXTENDED RELEASE 24 HR ORAL 2 TIMES DAILY WITH MEALS
Qty: 360 TABLET | Refills: 1 | Status: SHIPPED | OUTPATIENT
Start: 2021-05-28 | End: 2021-12-16

## 2021-05-28 RX ORDER — GABAPENTIN 300 MG/1
CAPSULE ORAL
Qty: 90 CAPSULE | Refills: 3 | Status: SHIPPED | OUTPATIENT
Start: 2021-05-28

## 2021-05-28 RX ORDER — LISINOPRIL 20 MG/1
20 TABLET ORAL DAILY
Qty: 90 TABLET | Refills: 1 | Status: SHIPPED | OUTPATIENT
Start: 2021-05-28 | End: 2021-11-23

## 2021-05-28 SDOH — SOCIAL STABILITY: SOCIAL INSECURITY
WITHIN THE LAST YEAR, HAVE YOU BEEN HUMILIATED OR EMOTIONALLY ABUSED IN OTHER WAYS BY YOUR PARTNER OR EX-PARTNER?: NOT ASKED

## 2021-05-28 SDOH — SOCIAL STABILITY: SOCIAL INSECURITY
WITHIN THE LAST YEAR, HAVE TO BEEN RAPED OR FORCED TO HAVE ANY KIND OF SEXUAL ACTIVITY BY YOUR PARTNER OR EX-PARTNER?: NOT ASKED

## 2021-05-28 SDOH — ECONOMIC STABILITY: FOOD INSECURITY: WITHIN THE PAST 12 MONTHS, YOU WORRIED THAT YOUR FOOD WOULD RUN OUT BEFORE YOU GOT MONEY TO BUY MORE.: NEVER TRUE

## 2021-05-28 SDOH — ECONOMIC STABILITY: TRANSPORTATION INSECURITY
IN THE PAST 12 MONTHS, HAS LACK OF TRANSPORTATION KEPT YOU FROM MEETINGS, WORK, OR FROM GETTING THINGS NEEDED FOR DAILY LIVING?: NO

## 2021-05-28 SDOH — SOCIAL STABILITY: SOCIAL NETWORK: IN A TYPICAL WEEK, HOW MANY TIMES DO YOU TALK ON THE PHONE WITH FAMILY, FRIENDS, OR NEIGHBORS?: NOT ASKED

## 2021-05-28 SDOH — SOCIAL STABILITY: SOCIAL NETWORK: HOW OFTEN DO YOU ATTEND CHURCH OR RELIGIOUS SERVICES?: NOT ASKED

## 2021-05-28 SDOH — HEALTH STABILITY: MENTAL HEALTH
STRESS IS WHEN SOMEONE FEELS TENSE, NERVOUS, ANXIOUS, OR CAN'T SLEEP AT NIGHT BECAUSE THEIR MIND IS TROUBLED. HOW STRESSED ARE YOU?: NOT ASKED

## 2021-05-28 SDOH — SOCIAL STABILITY: SOCIAL INSECURITY
WITHIN THE LAST YEAR, HAVE YOU BEEN KICKED, HIT, SLAPPED, OR OTHERWISE PHYSICALLY HURT BY YOUR PARTNER OR EX-PARTNER?: NOT ASKED

## 2021-05-28 SDOH — HEALTH STABILITY: PHYSICAL HEALTH: ON AVERAGE, HOW MANY DAYS PER WEEK DO YOU ENGAGE IN MODERATE TO STRENUOUS EXERCISE (LIKE A BRISK WALK)?: 3 DAYS

## 2021-05-28 SDOH — SOCIAL STABILITY: SOCIAL NETWORK: HOW OFTEN DO YOU GET TOGETHER WITH FRIENDS OR RELATIVES?: NOT ASKED

## 2021-05-28 SDOH — ECONOMIC STABILITY: INCOME INSECURITY: HOW HARD IS IT FOR YOU TO PAY FOR THE VERY BASICS LIKE FOOD, HOUSING, MEDICAL CARE, AND HEATING?: NOT HARD AT ALL

## 2021-05-28 SDOH — SOCIAL STABILITY: SOCIAL NETWORK: HOW OFTEN DO YOU ATTENT MEETINGS OF THE CLUB OR ORGANIZATION YOU BELONG TO?: NOT ASKED

## 2021-05-28 SDOH — HEALTH STABILITY: MENTAL HEALTH: HOW MANY STANDARD DRINKS CONTAINING ALCOHOL DO YOU HAVE ON A TYPICAL DAY?: 1 OR 2

## 2021-05-28 SDOH — SOCIAL STABILITY: SOCIAL NETWORK: ARE YOU MARRIED, WIDOWED, DIVORCED, SEPARATED, NEVER MARRIED, OR LIVING WITH A PARTNER?: SEPARATED

## 2021-05-28 SDOH — ECONOMIC STABILITY: TRANSPORTATION INSECURITY
IN THE PAST 12 MONTHS, HAS THE LACK OF TRANSPORTATION KEPT YOU FROM MEDICAL APPOINTMENTS OR FROM GETTING MEDICATIONS?: NO

## 2021-05-28 SDOH — HEALTH STABILITY: MENTAL HEALTH: HOW OFTEN DO YOU HAVE 6 OR MORE DRINKS ON ONE OCCASION?: NOT ASKED

## 2021-05-28 SDOH — ECONOMIC STABILITY: FOOD INSECURITY: WITHIN THE PAST 12 MONTHS, THE FOOD YOU BOUGHT JUST DIDN'T LAST AND YOU DIDN'T HAVE MONEY TO GET MORE.: NEVER TRUE

## 2021-05-28 SDOH — SOCIAL STABILITY: SOCIAL NETWORK
DO YOU BELONG TO ANY CLUBS OR ORGANIZATIONS SUCH AS CHURCH GROUPS UNIONS, FRATERNAL OR ATHLETIC GROUPS, OR SCHOOL GROUPS?: NOT ASKED

## 2021-05-28 SDOH — SOCIAL STABILITY: SOCIAL INSECURITY: WITHIN THE LAST YEAR, HAVE YOU BEEN AFRAID OF YOUR PARTNER OR EX-PARTNER?: NOT ASKED

## 2021-05-28 SDOH — HEALTH STABILITY: PHYSICAL HEALTH: ON AVERAGE, HOW MANY MINUTES DO YOU ENGAGE IN EXERCISE AT THIS LEVEL?: 60 MIN

## 2021-05-28 SDOH — HEALTH STABILITY: MENTAL HEALTH: HOW OFTEN DO YOU HAVE A DRINK CONTAINING ALCOHOL?: 2-3 TIMES A WEEK

## 2021-05-28 ASSESSMENT — ENCOUNTER SYMPTOMS
DYSURIA: 0
JOINT SWELLING: 0
CHILLS: 0
PALPITATIONS: 0
ABDOMINAL PAIN: 0
CONSTIPATION: 0
MYALGIAS: 0
HEARTBURN: 0
SHORTNESS OF BREATH: 0
NERVOUS/ANXIOUS: 0
HEMATOCHEZIA: 0
ARTHRALGIAS: 1
EYE PAIN: 0
HEADACHES: 0
FREQUENCY: 0
COUGH: 0
HEMATURIA: 0
FEVER: 0
PARESTHESIAS: 1
DIARRHEA: 0
NAUSEA: 0
SORE THROAT: 0
WEAKNESS: 0
DIZZINESS: 0

## 2021-05-28 ASSESSMENT — MIFFLIN-ST. JEOR: SCORE: 1612.71

## 2021-05-28 ASSESSMENT — ACTIVITIES OF DAILY LIVING (ADL): CURRENT_FUNCTION: NO ASSISTANCE NEEDED

## 2021-05-28 NOTE — PROGRESS NOTES
"    The patient was counseled and encouraged to consider modifying their diet and eating habits. He was provided with information on recommended healthy diet options.  Answers for HPI/ROS submitted by the patient on 5/28/2021   Annual Exam:  In general, how would you rate your overall physical health?: good  Frequency of exercise:: 2-3 days/week  Do you usually eat at least 4 servings of fruit and vegetables a day, include whole grains & fiber, and avoid regularly eating high fat or \"junk\" foods? : No  Taking medications regularly:: Yes  Medication side effects:: None  Activities of Daily Living: no assistance needed  Home safety: no safety concerns identified  Hearing Impairment:: no hearing concerns  In the past 6 months, have you been bothered by leaking of urine?: No  abdominal pain: No  Blood in stool: No  Blood in urine: No  chest pain: No  chills: No  congestion: No  constipation: No  cough: No  diarrhea: No  dizziness: No  ear pain: No  eye pain: No  nervous/anxious: No  fever: No  frequency: No  genital sores: No  headaches: No  hearing loss: No  heartburn: No  arthralgias: Yes  joint swelling: No  peripheral edema: No  mood changes: No  myalgias: No  nausea: No  dysuria: No  palpitations: No  Skin sensation changes: Yes  sore throat: No  urgency: No  rash: No  shortness of breath: No  visual disturbance: No  weakness: No  impotence: No  penile discharge: No  In general, how would you rate your overall mental or emotional health?: good  Additional concerns today:: Yes  Duration of exercise:: 45-60 minutes      "

## 2021-05-28 NOTE — PATIENT INSTRUCTIONS
Preventive Health Recommendations  Male Ages 50 - 64    Yearly exam:             See your health care provider every year in order to  o   Review health changes.   o   Discuss preventive care.    o   Review your medicines if your doctor has prescribed any.     Have a cholesterol test every 5 years, or more frequently if you are at risk for high cholesterol/heart disease.     Have a diabetes test (fasting glucose) every three years. If you are at risk for diabetes, you should have this test more often.     Have a colonoscopy at age 50, or have a yearly FIT test (stool test). These exams will check for colon cancer.      Talk with your health care provider about whether or not a prostate cancer screening test (PSA) is right for you.    You should be tested each year for STDs (sexually transmitted diseases), if you re at risk.     Shots: Get a flu shot each year. Get a tetanus shot every 10 years.     Nutrition:    Eat at least 5 servings of fruits and vegetables daily.     Eat whole-grain bread, whole-wheat pasta and brown rice instead of white grains and rice.     Get adequate Calcium and Vitamin D.     Lifestyle    Exercise for at least 150 minutes a week (30 minutes a day, 5 days a week). This will help you control your weight and prevent disease.     Limit alcohol to one drink per day.     No smoking.     Wear sunscreen to prevent skin cancer.     See your dentist every six months for an exam and cleaning.     See your eye doctor every 1 to 2 years.    Patient Education   Personalized Prevention Plan  You are due for the preventive services outlined below.  Your care team is available to assist you in scheduling these services.  If you have already completed any of these items, please share that information with your care team to update in your medical record.  Health Maintenance Due   Topic Date Due     URINE DRUG SCREEN  Never done     ANNUAL REVIEW OF HM ORDERS  Never done     Colorectal Cancer Screening   Never done     COVID-19 Vaccine (1) Never done     Hepatitis B Vaccine (1 of 3 - Risk 3-dose series) Never done     Annual Wellness Visit  10/13/2011     Zoster (Shingles) Vaccine (1 of 2) Never done     Eye Exam  06/25/2019     Kidney Microalbumin Urine Test  06/27/2019     Diabetic Foot Exam  06/27/2019     A1C Lab  11/29/2019     Diptheria Tetanus Pertussis (DTAP/TDAP/TD) Vaccine (2 - Td) 02/22/2020     Cholesterol Lab  05/29/2020     Basic Metabolic Panel  06/26/2020       Understanding USDA MyPlate  The USDA has guidelines to help you make healthy food choices. These are called MyPlate. MyPlate shows the food groups that make up healthy meals using the image of a place setting. Before you eat, think about the healthiest choices for what to put on your plate or in your cup or bowl. To learn more about building a healthy plate, visit www.choosemyplate.gov.    The food groups    Fruits. Any fruit or 100% fruit juice counts as part of the Fruit Group. Fruits may be fresh, canned, frozen, or dried, and may be whole, cut-up, or pureed. Make 1/2 of your plate fruits and vegetables.    Vegetables. Any vegetable or 100% vegetable juice counts as a member of the Vegetable Group. Vegetables may be fresh, frozen, canned, or dried. They can be served raw or cooked and may be whole, cut-up, or mashed. Make 1/2 of your plate fruits and vegetables.    Grains. All foods made from grains are part of the Grains Group. These include wheat, rice, oats, cornmeal, and barley. Grains are often used to make foods such as bread, pasta, oatmeal, cereal, tortillas, and grits. Grains should be no more than 1/4 of your plate. At least half of your grains should be whole grains.    Protein. This group includes meat, poultry, seafood, beans and peas, eggs, processed soy products (such as tofu), nuts (including nut butters), and seeds. Make protein choices no more than 1/4 of your plate. Meat and poultry choices should be lean or low  fat.    Dairy. The Dairy Group includes all fluid milk products and foods made from milk that contain calcium, such as yogurt and cheese. (Foods that have little calcium, such as cream, butter, and cream cheese, are not part of this group.) Most dairy choices should be low-fat or fat-free.    Oils. Oils aren't a food group, but they do contain essential nutrients. However it's important to watch your intake of oils. These are fats that are liquid at room temperature. They include canola, corn, olive, soybean, vegetable, and sunflower oil. Foods that are mainly oil include mayonnaise, certain salad dressings, and soft margarines. You likely already get your daily oil allowance from the foods you eat.  Things to limit  Eating healthy also means limiting these things in your diet:       Salt (sodium). Many processed foods have a lot of sodium. To keep sodium intake down, eat fresh vegetables, meats, poultry, and seafood when possible. Purchase low-sodium, reduced-sodium, or no-salt-added food products at the store. And don't add salt to your meals at home. Instead, season them with herbs and spices such as dill, oregano, cumin, and paprika. Or try adding flavor with lemon or lime zest and juice.    Saturated fat. Saturated fats are most often found in animal products such as beef, pork, and chicken. They are often solid at room temperature, such as butter. To reduce your saturated fat intake, choose leaner cuts of meat and poultry. And try healthier cooking methods such as grilling, broiling, roasting, or baking. For a simple lower-fat swap, use plain nonfat yogurt instead of mayonnaise when making potato salad or macaroni salad.    Added sugars. These are sugars added to foods. They are in foods such as ice cream, candy, soda, fruit drinks, sports drinks, energy drinks, cookies, pastries, jams, and syrups. Cut down on added sugars by sharing sweet treats with a family member or friend. You can also choose fruit for  dessert, and drink water or other unsweetened beverages.     [employer] last reviewed this educational content on 6/1/2020 2000-2021 [employer]. All rights reserved. This information is not intended as a substitute for professional medical care. Always follow your healthcare professional's instructions.         We will see how today's labs look when they return.  Refilled current meds.     Someone will contact you to help schedule a visit with the Diabetes educator.   They may offer advice on the best medication combination for diabetes.     See me back in about three months.        Bill For Surgical Tray: no

## 2021-05-28 NOTE — PROGRESS NOTES
"SUBJECTIVE:   CC: Latrell Knowles is an 56 year old male who presents for preventative health visit.   He is accompanied by his daughter who assists in providing history and in translating Malaysian which is his first language.    Patient has been advised of split billing requirements and indicates understanding: Yes  Healthy Habits:     In general, how would you rate your overall health?  Good    Frequency of exercise:  2-3 days/week    Duration of exercise:  45-60 minutes    Do you usually eat at least 4 servings of fruit and vegetables a day, include whole grains    & fiber and avoid regularly eating high fat or \"junk\" foods?  No    Taking medications regularly:  Yes    Medication side effects:  None    Ability to successfully perform activities of daily living:  No assistance needed    Home Safety:  No safety concerns identified    Hearing Impairment:  No hearing concerns    In the past 6 months, have you been bothered by leaking of urine?  No    In general, how would you rate your overall mental or emotional health?  Good      PHQ-2 Total Score: 0    Additional concerns today:  Yes        PROBLEMS TO ADD ON... In addition to a preventive exam, we addressed uncontrolled diabetes and hyperlipidemia.    The patient reports checking his glucoses about every other day in the morning and sometimes after dinner.  Morning glucoses tend to run in the 150-200 range.  His A1c from May 2019 was elevated at 11.8. His LDL-Cholesterol at that time was 94 on statins.    The patient has recently run out of his medications.  He expresses an interest in trying Januvia or a DPP 4 agent.    He has a history of chronic low back pain for which he takes gabapentin.  This is refilled.      Today's PHQ-2 Score:   PHQ-2 ( 1999 Pfizer) 5/28/2021   Q1: Little interest or pleasure in doing things -   Q2: Feeling down, depressed or hopeless -   PHQ-2 Score -   PHQ-2 Score Incomplete       Abuse: Current or Past(Physical, Sexual or Emotional)- " No  Do you feel safe in your environment? Yes    Have you ever done Advance Care Planning? (For example, a Health Directive, POLST, or a discussion with a medical provider or your loved ones about your wishes): No, advance care planning information given to patient to review.  Patient declined advance care planning discussion at this time.    Social History     Tobacco Use     Smoking status: Never Smoker     Smokeless tobacco: Never Used   Substance Use Topics     Alcohol use: Yes     Comment: rarely         Alcohol Use 5/6/2015   Prescreen: >3 drinks/day or >7 drinks/week? The patient does not drink >3 drinks per day nor >7 drinks per week.       Last PSA: No results found for: PSA    Reviewed orders with patient. Reviewed health maintenance and updated orders accordingly - Yes      Reviewed and updated as needed this visit by clinical staff  Tobacco  Allergies  Meds   Med Hx  Surg Hx  Fam Hx  Soc Hx        Reviewed and updated as needed this visit by Provider                  Review of Systems   Constitutional: Negative for chills and fever.   HENT: Negative for congestion, ear pain, hearing loss and sore throat.    Eyes: Negative for pain and visual disturbance.   Respiratory: Negative for cough and shortness of breath.    Cardiovascular: Negative for chest pain, palpitations and peripheral edema.   Gastrointestinal: Negative for abdominal pain, constipation, diarrhea, heartburn, hematochezia and nausea.   Genitourinary: Negative for discharge, dysuria, frequency, genital sores, hematuria, impotence and urgency.   Musculoskeletal: Positive for arthralgias. Negative for joint swelling and myalgias.   Skin: Negative for rash.   Neurological: Positive for paresthesias. Negative for dizziness, weakness and headaches.   Psychiatric/Behavioral: Negative for mood changes. The patient is not nervous/anxious.          OBJECTIVE:   Vitals: /89   Pulse 63   Temp 98.1  F (36.7  C) (Oral)   Resp 18   Ht 1.77  "m (5' 9.68\")   Wt 78.7 kg (173 lb 6.4 oz)   SpO2 99%   BMI 25.11 kg/m    BMI= Body mass index is 25.11 kg/m .    Physical Exam  GENERAL: healthy, alert and no distress  EYES: Eyes grossly normal to inspection, PERRL and conjunctivae and sclerae normal  HENT: ear canals and TM's normal, nose and mouth without ulcers or lesions  NECK: no adenopathy, no asymmetry, masses, or scars and thyroid normal to palpation  RESP: lungs clear to auscultation - no rales, rhonchi or wheezes  CV: regular rate and rhythm, normal S1 S2, no S3 or S4, no murmur, click or rub, no peripheral edema and peripheral pulses strong  ABDOMEN: soft, nontender, no hepatosplenomegaly, no masses and bowel sounds normal  MS: no gross musculoskeletal defects noted, no edema  SKIN: no suspicious lesions or rashes  NEURO: Normal strength and tone, mentation intact and speech normal  PSYCH: mentation appears normal, affect normal/bright  Diabetic foot exam: normal DP and PT pulses, no trophic changes or ulcerative lesions and normal sensory exam    Diagnostic Test Results:  Labs reviewed in Epic    ASSESSMENT/PLAN:   (Z00.00) Encounter for Medicare annual wellness exam  (primary encounter diagnosis)  Comment: Stable health. See epic orders.     (E11.65) Uncontrolled type 2 diabetes mellitus with hyperglycemia (H)  Comment: Resume metformin,add Invokana. Meet with diabetes educator.  Plan: lisinopril (ZESTRIL) 20 MG tablet, metFORMIN         (GLUCOPHAGE-XR) 500 MG 24 hr tablet,         rosuvastatin (CRESTOR) 10 MG tablet, AMBULATORY        ADULT DIABETES EDUCATOR REFERRAL, OFFICE/OUTPT         VISIT,EST,LEVL III, FOOT EXAM          (E78.5) Hyperlipidemia LDL goal <100  Comment: Previous LDL at target. Continue current meds.   Plan: Comprehensive metabolic panel, Lipid panel         reflex to direct LDL Non-fasting, OFFICE/OUTPT         VISIT,EST,LEVL III          (E11.21) Type 2 diabetes mellitus with diabetic nephropathy, without long-term current use " "of insulin (H)  Update labs.   Plan: canagliflozin (INVOKANA) 100 MG tablet,         Hemoglobin A1c, Albumin Random Urine         Quantitative with Creat Ratio, TSH with free T4        reflex          (E11.29,  R80.9) Microalbuminuria due to type 2 diabetes mellitus (H)  Plan: lisinopril (ZESTRIL) 20 MG tablet, metFORMIN         (GLUCOPHAGE-XR) 500 MG 24 hr tablet          (M96.1) Failed back surgical syndrome  Comment: Resume gabapentin.   Plan: gabapentin (NEURONTIN) 300 MG capsule          (Z79.899) Medication management  Plan: CBC with platelets          (Z12.5) Screening PSA (prostate specific antigen)  Plan: Prostate spec antigen screen            Patient has been advised of split billing requirements and indicates understanding: Yes  COUNSELING:   Reviewed preventive health counseling, as reflected in patient instructions    Estimated body mass index is 23.44 kg/m  as calculated from the following:    Height as of this encounter: 1.77 m (5' 9.68\").    Weight as of 6/26/19: 73.4 kg (161 lb 14.4 oz).         He reports that he has never smoked. He has never used smokeless tobacco.      Counseling Resources:  ATP IV Guidelines  Pooled Cohorts Equation Calculator  FRAX Risk Assessment  ICSI Preventive Guidelines  Dietary Guidelines for Americans, 2010  [employer]'s MyPlate  ASA Prophylaxis  Lung CA Screening      Patient Instructions     We will see how today's labs look when they return.  Refilled current meds.     Someone will contact you to help schedule a visit with the Diabetes educator.   They may offer advice on the best medication combination for diabetes.     See me back in about three months.     Mandeep Thomas MD,   [facility]  "

## 2021-06-01 LAB
CREAT UR-MCNC: 42 MG/DL
MICROALBUMIN UR-MCNC: 56 MG/L
MICROALBUMIN/CREAT UR: 131.84 MG/G CR (ref 0–17)
PSA SERPL-ACNC: 1.26 UG/L (ref 0–4)

## 2021-06-02 ENCOUNTER — TELEPHONE (OUTPATIENT)
Dept: INTERNAL MEDICINE | Facility: CLINIC | Age: 56
End: 2021-06-02

## 2021-06-02 NOTE — TELEPHONE ENCOUNTER
Diabetes Education Scheduling Outreach #1:    Call to patient to schedule. Patient declined to schedule and will call back later to schedule.    Diana Patterson  [place] [facility]  Diabetes and Nutrition Scheduling

## 2021-08-27 ENCOUNTER — OFFICE VISIT (OUTPATIENT)
Dept: INTERNAL MEDICINE | Facility: CLINIC | Age: 56
End: 2021-08-27
Payer: MEDICARE

## 2021-08-27 VITALS
RESPIRATION RATE: 18 BRPM | DIASTOLIC BLOOD PRESSURE: 78 MMHG | BODY MASS INDEX: 24.29 KG/M2 | TEMPERATURE: 97.8 F | OXYGEN SATURATION: 98 % | HEART RATE: 76 BPM | WEIGHT: 169.7 LBS | SYSTOLIC BLOOD PRESSURE: 138 MMHG | HEIGHT: 70 IN

## 2021-08-27 DIAGNOSIS — E11.21 TYPE 2 DIABETES MELLITUS WITH DIABETIC NEPHROPATHY, WITHOUT LONG-TERM CURRENT USE OF INSULIN (H): Primary | ICD-10-CM

## 2021-08-27 DIAGNOSIS — E78.5 HYPERLIPIDEMIA LDL GOAL <100: ICD-10-CM

## 2021-08-27 DIAGNOSIS — I10 ESSENTIAL HYPERTENSION: ICD-10-CM

## 2021-08-27 DIAGNOSIS — N52.01 ERECTILE DYSFUNCTION DUE TO ARTERIAL INSUFFICIENCY: ICD-10-CM

## 2021-08-27 PROCEDURE — 99214 OFFICE O/P EST MOD 30 MIN: CPT | Performed by: INTERNAL MEDICINE

## 2021-08-27 RX ORDER — DAPAGLIFLOZIN 5 MG/1
5 TABLET, FILM COATED ORAL DAILY
Qty: 30 TABLET | Refills: 0 | Status: SHIPPED | OUTPATIENT
Start: 2021-08-27

## 2021-08-27 RX ORDER — DAPAGLIFLOZIN 10 MG/1
10 TABLET, FILM COATED ORAL DAILY
Qty: 30 TABLET | Refills: 1 | Status: SHIPPED | OUTPATIENT
Start: 2021-08-27

## 2021-08-27 RX ORDER — SILDENAFIL CITRATE 20 MG/1
TABLET ORAL
Qty: 30 TABLET | Refills: 4 | Status: SHIPPED | OUTPATIENT
Start: 2021-08-27

## 2021-08-27 RX ORDER — SILDENAFIL 100 MG/1
100 TABLET, FILM COATED ORAL DAILY PRN
Qty: 6 TABLET | Refills: 4 | Status: SHIPPED | OUTPATIENT
Start: 2021-08-27

## 2021-08-27 ASSESSMENT — MIFFLIN-ST. JEOR: SCORE: 1595.92

## 2021-08-27 NOTE — PROGRESS NOTES
"  Assessment & Plan   (E11.21) Type 2 diabetes mellitus with diabetic nephropathy, without long-term current use of insulin (H)  (primary encounter diagnosis)  Comment: See pt instructions and epic orders.   Plan: dapagliflozin (FARXIGA) 5 MG TABS tablet,         dapagliflozin (FARXIGA) 10 MG TABS tablet,         **A1C FUTURE 3mo          (N52.01) Erectile dysfunction due to arterial insufficiency  Comment: Offered Rx for sildenafil.   Plan: sildenafil (REVATIO) 20 MG tablet, sildenafil         (VIAGRA) 100 MG tablet          (E78.5) Hyperlipidemia LDL goal <100  Comment: LDL at target. Continue current meds.     (I10) Essential hypertension  Comment: BP at target. Continue current meds.        Patient Instructions   Begin taking Farxiga (dapagliflozin) 5 mg once daily for one month, then okay to increase to 10 mg once daily.     May take sildenafil at 100 mg each day as needed. This may be either five of the 20 mg tablets, or one of the 100 mg tablets.     The other meds stay the same.     Return in early November for a \"lab only\" appointment to check Hemoglobin A1c, then an office appointment a few days later.       Return in about 2 months (around 10/27/2021) for Diabetes Recheck.    Mandeep Thomas MD, MD  [facility] is a 56 year old who presents for the following health issues   Patient is being seen for an medication follow up.  HPI     Diabetes Follow-up    How often are you checking your blood sugar? A few times a month  What time of day are you checking your blood sugars (select all that apply)?  After meals  Have you had any blood sugars above 200?  No  Have you had any blood sugars below 70?  No    What symptoms do you notice when your blood sugar is low?  None    What concerns do you have today about your diabetes? None     Do you have any of these symptoms? (Select all that apply)  No numbness or tingling in feet.  No redness, sores or blisters on feet. "  No complaints of excessive thirst.  No reports of blurry vision.  No significant changes to weight.    Have you had a diabetic eye exam in the last 12 months? No                Hyperlipidemia Follow-Up      Are you regularly taking any medication or supplement to lower your cholesterol?   Yes- Rosuvastatin    Are you having muscle aches or other side effects that you think could be caused by your cholesterol lowering medication?  Yes- MUSCLE ACHES    Hypertension Follow-up      Do you check your blood pressure regularly outside of the clinic? No     Are you following a low salt diet? No    Are your blood pressures ever more than 140 on the top number (systolic) OR more   than 90 on the bottom number (diastolic), for example 140/90? No    BP Readings from Last 2 Encounters:   05/28/21 138/89   06/26/19 110/60     Hemoglobin A1C (%)   Date Value   05/28/2021 7.9 (H)   05/29/2019 11.8 (H)     LDL Cholesterol Calculated (mg/dL)   Date Value   05/28/2021 71   05/29/2019 94         How many servings of fruits and vegetables do you eat daily?  4 or more    On average, how many sweetened beverages do you drink each day (Examples: soda, juice, sweet tea, etc.  Do NOT count diet or artificially sweetened beverages)?   1    How many days per week do you exercise enough to make your heart beat faster? 3 or less    How many minutes a day do you exercise enough to make your heart beat faster? 60 or more    How many days per week do you miss taking your medication? 0        The patient was unable to begin taking Invokana as his insurance formulary did not cover this medication. We discussed trying to have him start either Faxiga or Jardiance, which evidently are preferred by his formulary.   Glucoses reportedly run in the 140-150 range.   He takes Basaglar at 12 units daily, and metformin at 1000 mg twice daily.    Hyperlipidemia appears satisfactorily controlled on Crestor.   BP controlled on current meds.     Sildenafil is  "refilled for erectile dysfunction.     Past medical, family and social histories as well as medications reviewed and updated as needed.    Review of Systems   No dyspnea or cough. No chest discomfort, dizziness or palpitations. No diarrhea, abdominal pain or rectal bleeding.   No acute problems with vision or speech, lateralizing weakness or paresthesias.    ROS: as above or negative for Respiratory, CV, GI, endocrine, neuro systems.       Objective    Vitals: /78   Pulse 76   Temp 97.8  F (36.6  C) (Tympanic)   Resp 18   Ht 1.77 m (5' 9.68\")   Wt 77 kg (169 lb 11.2 oz)   SpO2 98%   BMI 24.57 kg/m    BMI= Body mass index is 24.57 kg/m .     Physical Exam   GENERAL: healthy, alert and no distress  RESP: lungs clear to auscultation - no rales, rhonchi or wheezes  CV: regular rate and rhythm, normal S1 S2, no S3 or S4, no murmur, click or rub, no peripheral edema and peripheral pulses strong  MS: no gross musculoskeletal defects noted, no edema  NEURO: Normal strength and tone, mentation intact and speech normal  PSYCH: mentation appears normal, affect normal/bright            "

## 2021-08-27 NOTE — PATIENT INSTRUCTIONS
"Begin taking Farxiga (dapagliflozin) 5 mg once daily for one month, then okay to increase to 10 mg once daily.     May take sildenafil at 100 mg each day as needed. This may be either five of the 20 mg tablets, or one of the 100 mg tablets.     The other meds stay the same.     Return in early November for a \"lab only\" appointment to check Hemoglobin A1c, then an office appointment a few days later.   "

## 2022-05-31 ENCOUNTER — TELEPHONE (OUTPATIENT)
Dept: INTERNAL MEDICINE | Facility: CLINIC | Age: 57
End: 2022-05-31

## 2022-05-31 NOTE — TELEPHONE ENCOUNTER
Patient Quality Outreach    Patient is due for the following:   Diabetes -  A1C, Eye Exam and Diabetic Follow-Up Visit  Physical  - Due after 5/28/2022    NEXT STEPS:   Schedule a yearly physical    Type of outreach:    Sent [employer] message.      Questions for provider review:    None     Judith Martinez, [facility]

## 2022-06-06 DIAGNOSIS — E11.29 MICROALBUMINURIA DUE TO TYPE 2 DIABETES MELLITUS (H): ICD-10-CM

## 2022-06-06 DIAGNOSIS — E11.65 UNCONTROLLED TYPE 2 DIABETES MELLITUS WITH HYPERGLYCEMIA (H): ICD-10-CM

## 2022-06-06 DIAGNOSIS — R80.9 MICROALBUMINURIA DUE TO TYPE 2 DIABETES MELLITUS (H): ICD-10-CM

## 2022-06-08 RX ORDER — METFORMIN HCL 500 MG
TABLET, EXTENDED RELEASE 24 HR ORAL
Qty: 360 TABLET | Refills: 0 | OUTPATIENT
Start: 2022-06-08

## 2022-06-08 NOTE — TELEPHONE ENCOUNTER
Routing refill request to provider for review/approval because:  Mojgan given x1 and patient did not follow up, please advise  Labs not current:  A1c  Patient needs to be seen because:  Due for diabetes follow-up.    Chey Hernandez RN  [facility]

## 2022-07-19 DIAGNOSIS — R80.9 MICROALBUMINURIA DUE TO TYPE 2 DIABETES MELLITUS (H): ICD-10-CM

## 2022-07-19 DIAGNOSIS — E11.65 UNCONTROLLED TYPE 2 DIABETES MELLITUS WITH HYPERGLYCEMIA (H): ICD-10-CM

## 2022-07-19 DIAGNOSIS — E11.29 MICROALBUMINURIA DUE TO TYPE 2 DIABETES MELLITUS (H): ICD-10-CM

## 2022-07-20 RX ORDER — METFORMIN HCL 500 MG
1000 TABLET, EXTENDED RELEASE 24 HR ORAL 2 TIMES DAILY WITH MEALS
Qty: 120 TABLET | Refills: 0 | Status: SHIPPED | OUTPATIENT
Start: 2022-07-20 | End: 2022-08-16

## 2022-07-20 NOTE — TELEPHONE ENCOUNTER
Metformin 500 mg  Routing refill request to provider for review/approval because:  Mojgan given x1 and patient did not follow up, please advise  Labs not current:  Hgb A1C    LOV 08/27/2021

## 2022-08-11 DIAGNOSIS — R80.9 MICROALBUMINURIA DUE TO TYPE 2 DIABETES MELLITUS (H): ICD-10-CM

## 2022-08-11 DIAGNOSIS — E11.65 UNCONTROLLED TYPE 2 DIABETES MELLITUS WITH HYPERGLYCEMIA (H): ICD-10-CM

## 2022-08-11 DIAGNOSIS — E11.29 MICROALBUMINURIA DUE TO TYPE 2 DIABETES MELLITUS (H): ICD-10-CM

## 2022-08-11 NOTE — TELEPHONE ENCOUNTER
Pharmacy is requesting new Rx for 90 days supply to make it more affordable for Pt.  Dionne ALBARRAN, BENJAMIN,AAMA

## 2022-08-16 NOTE — TELEPHONE ENCOUNTER
Patient's cell number message on machine to call back.  Patient's number daughter answers.No consent to communicate on file for daughter. Daughter states [place] end of [place].   Maybe patient has new provider?

## 2022-08-16 NOTE — TELEPHONE ENCOUNTER
Routing refill request to provider for review/approval because:  Labs not current:  Overdue for labs  Hemoglobin A1C   Date Value Ref Range Status   05/28/2021 7.9 (H) 0 - 5.6 % Final     Comment:     Normal <5.7% Prediabetes 5.7-6.4%  Diabetes 6.5% or higher - adopted from ADA   consensus guidelines.        Creatinine   Date Value Ref Range Status   05/28/2021 0.75 0.66 - 1.25 mg/dL Final      Patient needs to be seen because:  due for appt.  Will forward to Dr Thomas, last saw Dr Rosado on  9/2019.    Will forward to station.  Please help pt schedule appt.  Ann SINGER RN

## 2022-08-20 RX ORDER — METFORMIN HCL 500 MG
1000 TABLET, EXTENDED RELEASE 24 HR ORAL 2 TIMES DAILY WITH MEALS
Qty: 120 TABLET | Refills: 0 | Status: SHIPPED | OUTPATIENT
Start: 2022-08-20